# Patient Record
Sex: MALE | Race: BLACK OR AFRICAN AMERICAN | Employment: UNEMPLOYED | URBAN - METROPOLITAN AREA
[De-identification: names, ages, dates, MRNs, and addresses within clinical notes are randomized per-mention and may not be internally consistent; named-entity substitution may affect disease eponyms.]

---

## 2019-08-07 ENCOUNTER — OFFICE VISIT (OUTPATIENT)
Dept: INTERNAL MEDICINE | Age: 24
End: 2019-08-07
Payer: COMMERCIAL

## 2019-08-07 VITALS
SYSTOLIC BLOOD PRESSURE: 124 MMHG | WEIGHT: 200 LBS | HEART RATE: 113 BPM | DIASTOLIC BLOOD PRESSURE: 84 MMHG | BODY MASS INDEX: 30.41 KG/M2

## 2019-08-07 DIAGNOSIS — Z23 NEED FOR PROPHYLACTIC VACCINATION AGAINST STREPTOCOCCUS PNEUMONIAE (PNEUMOCOCCUS): ICD-10-CM

## 2019-08-07 DIAGNOSIS — L91.0 KELOID SCAR: Primary | ICD-10-CM

## 2019-08-07 DIAGNOSIS — Z23 ENCOUNTER FOR VACCINATION: ICD-10-CM

## 2019-08-07 DIAGNOSIS — Z86.59 HISTORY OF ADHD: ICD-10-CM

## 2019-08-07 DIAGNOSIS — Z13.31 POSITIVE DEPRESSION SCREENING: ICD-10-CM

## 2019-08-07 DIAGNOSIS — G47.9 SLEEP DISTURBANCE: ICD-10-CM

## 2019-08-07 DIAGNOSIS — Z23 NEED FOR TDAP VACCINATION: ICD-10-CM

## 2019-08-07 DIAGNOSIS — Z78.9 UNCIRCUMCISED MALE: ICD-10-CM

## 2019-08-07 PROCEDURE — 90715 TDAP VACCINE 7 YRS/> IM: CPT | Performed by: INTERNAL MEDICINE

## 2019-08-07 PROCEDURE — 90632 HEPA VACCINE ADULT IM: CPT | Performed by: INTERNAL MEDICINE

## 2019-08-07 PROCEDURE — G8419 CALC BMI OUT NRM PARAM NOF/U: HCPCS | Performed by: STUDENT IN AN ORGANIZED HEALTH CARE EDUCATION/TRAINING PROGRAM

## 2019-08-07 PROCEDURE — G0010 ADMIN HEPATITIS B VACCINE: HCPCS | Performed by: INTERNAL MEDICINE

## 2019-08-07 PROCEDURE — 1036F TOBACCO NON-USER: CPT | Performed by: STUDENT IN AN ORGANIZED HEALTH CARE EDUCATION/TRAINING PROGRAM

## 2019-08-07 PROCEDURE — G8431 POS CLIN DEPRES SCRN F/U DOC: HCPCS | Performed by: STUDENT IN AN ORGANIZED HEALTH CARE EDUCATION/TRAINING PROGRAM

## 2019-08-07 PROCEDURE — 90732 PPSV23 VACC 2 YRS+ SUBQ/IM: CPT | Performed by: INTERNAL MEDICINE

## 2019-08-07 PROCEDURE — 99203 OFFICE O/P NEW LOW 30 MIN: CPT | Performed by: STUDENT IN AN ORGANIZED HEALTH CARE EDUCATION/TRAINING PROGRAM

## 2019-08-07 PROCEDURE — G8427 DOCREV CUR MEDS BY ELIG CLIN: HCPCS | Performed by: STUDENT IN AN ORGANIZED HEALTH CARE EDUCATION/TRAINING PROGRAM

## 2019-08-07 RX ORDER — UREA 10 %
1 LOTION (ML) TOPICAL NIGHTLY PRN
Qty: 30 TABLET | Refills: 0 | Status: SHIPPED | OUTPATIENT
Start: 2019-08-07

## 2019-08-07 ASSESSMENT — PATIENT HEALTH QUESTIONNAIRE - PHQ9
SUM OF ALL RESPONSES TO PHQ QUESTIONS 1-9: 17
4. FEELING TIRED OR HAVING LITTLE ENERGY: 3
10. IF YOU CHECKED OFF ANY PROBLEMS, HOW DIFFICULT HAVE THESE PROBLEMS MADE IT FOR YOU TO DO YOUR WORK, TAKE CARE OF THINGS AT HOME, OR GET ALONG WITH OTHER PEOPLE: 3
8. MOVING OR SPEAKING SO SLOWLY THAT OTHER PEOPLE COULD HAVE NOTICED. OR THE OPPOSITE, BEING SO FIGETY OR RESTLESS THAT YOU HAVE BEEN MOVING AROUND A LOT MORE THAN USUAL: 3
7. TROUBLE CONCENTRATING ON THINGS, SUCH AS READING THE NEWSPAPER OR WATCHING TELEVISION: 3
9. THOUGHTS THAT YOU WOULD BE BETTER OFF DEAD, OR OF HURTING YOURSELF: 1
2. FEELING DOWN, DEPRESSED OR HOPELESS: 1
5. POOR APPETITE OR OVEREATING: 0
3. TROUBLE FALLING OR STAYING ASLEEP: 3
SUM OF ALL RESPONSES TO PHQ9 QUESTIONS 1 & 2: 3
SUM OF ALL RESPONSES TO PHQ QUESTIONS 1-9: 17
6. FEELING BAD ABOUT YOURSELF - OR THAT YOU ARE A FAILURE OR HAVE LET YOURSELF OR YOUR FAMILY DOWN: 1
1. LITTLE INTEREST OR PLEASURE IN DOING THINGS: 2

## 2019-08-07 NOTE — PROGRESS NOTES
Patient and his mom updated to follow-up with psychiatry. Encounter for vaccination   -Patient updated regarding hepatitis A, hepatitis B, varicella vaccine, pneumococcal vaccine. The patient is unsure about her last tetanus booster. The patient will get a tetanus booster as well. Sleep disturbance   -Melatonin ordered as needed for sleep at night. The patient is recommended to follow-up with psychiatry for history of ADHD and schizophrenia. INSTRUCTIONS:   · No follow-ups on file. · Koby received counseling on the following healthy behaviors: nutrition, exercise and tobacco cessation    · Reviewed prior labs and health maintenance. · Discussed use, benefit, and side effects of prescribed medications. Barriers to medication compliance addressed. All patient questions answered. Pt voiced understanding.      · Patient given educational materials - see patient instructions    Laisha Herrera MD  PGY-2, Department of Internal Medicine  Beti [place], [place]  8/7/2019 1:39 PM

## 2019-11-13 ENCOUNTER — HOSPITAL ENCOUNTER (OUTPATIENT)
Age: 24
Setting detail: SPECIMEN
Discharge: HOME | End: 2019-11-13
Payer: COMMERCIAL

## 2019-11-13 ENCOUNTER — OFFICE VISIT (OUTPATIENT)
Dept: INTERNAL MEDICINE | Age: 24
End: 2019-11-13
Payer: COMMERCIAL

## 2019-11-13 VITALS
SYSTOLIC BLOOD PRESSURE: 145 MMHG | HEART RATE: 138 BPM | WEIGHT: 200 LBS | DIASTOLIC BLOOD PRESSURE: 77 MMHG | BODY MASS INDEX: 30.41 KG/M2

## 2019-11-13 DIAGNOSIS — F33.1 MODERATE EPISODE OF RECURRENT MAJOR DEPRESSIVE DISORDER (HCC): ICD-10-CM

## 2019-11-13 DIAGNOSIS — L91.0 KELOID: ICD-10-CM

## 2019-11-13 DIAGNOSIS — F33.1 MODERATE EPISODE OF RECURRENT MAJOR DEPRESSIVE DISORDER (HCC): Primary | ICD-10-CM

## 2019-11-13 DIAGNOSIS — Z23 NEEDS FLU SHOT: ICD-10-CM

## 2019-11-13 DIAGNOSIS — R00.0 TACHYCARDIA: ICD-10-CM

## 2019-11-13 PROBLEM — F32.9 MAJOR DEPRESSION: Status: ACTIVE | Noted: 2019-11-13

## 2019-11-13 LAB — TSH SERPL DL<=0.05 MIU/L-ACNC: 1.64 MIU/L (ref 0.3–5)

## 2019-11-13 PROCEDURE — G8419 CALC BMI OUT NRM PARAM NOF/U: HCPCS | Performed by: STUDENT IN AN ORGANIZED HEALTH CARE EDUCATION/TRAINING PROGRAM

## 2019-11-13 PROCEDURE — 93010 ELECTROCARDIOGRAM REPORT: CPT | Performed by: STUDENT IN AN ORGANIZED HEALTH CARE EDUCATION/TRAINING PROGRAM

## 2019-11-13 PROCEDURE — 90688 IIV4 VACCINE SPLT 0.5 ML IM: CPT | Performed by: STUDENT IN AN ORGANIZED HEALTH CARE EDUCATION/TRAINING PROGRAM

## 2019-11-13 PROCEDURE — 1036F TOBACCO NON-USER: CPT | Performed by: STUDENT IN AN ORGANIZED HEALTH CARE EDUCATION/TRAINING PROGRAM

## 2019-11-13 PROCEDURE — G8427 DOCREV CUR MEDS BY ELIG CLIN: HCPCS | Performed by: STUDENT IN AN ORGANIZED HEALTH CARE EDUCATION/TRAINING PROGRAM

## 2019-11-13 PROCEDURE — 99213 OFFICE O/P EST LOW 20 MIN: CPT | Performed by: STUDENT IN AN ORGANIZED HEALTH CARE EDUCATION/TRAINING PROGRAM

## 2019-11-13 PROCEDURE — 84443 ASSAY THYROID STIM HORMONE: CPT

## 2019-11-13 PROCEDURE — G8482 FLU IMMUNIZE ORDER/ADMIN: HCPCS | Performed by: STUDENT IN AN ORGANIZED HEALTH CARE EDUCATION/TRAINING PROGRAM

## 2019-11-13 PROCEDURE — 36415 COLL VENOUS BLD VENIPUNCTURE: CPT

## 2019-11-13 PROCEDURE — 99211 OFF/OP EST MAY X REQ PHY/QHP: CPT | Performed by: INTERNAL MEDICINE

## 2019-11-13 PROCEDURE — 93005 ELECTROCARDIOGRAM TRACING: CPT | Performed by: STUDENT IN AN ORGANIZED HEALTH CARE EDUCATION/TRAINING PROGRAM

## 2019-11-13 RX ORDER — DIAPER,BRIEF,INFANT-TODD,DISP
EACH MISCELLANEOUS
Qty: 1 TUBE | Refills: 1 | Status: SHIPPED | OUTPATIENT
Start: 2019-11-13 | End: 2019-11-20

## 2019-11-13 RX ORDER — SERTRALINE HYDROCHLORIDE 25 MG/1
25 TABLET, FILM COATED ORAL DAILY
Qty: 90 TABLET | Refills: 1 | Status: SHIPPED | OUTPATIENT
Start: 2019-11-13 | End: 2022-10-28 | Stop reason: ALTCHOICE

## 2022-08-03 ENCOUNTER — TELEPHONE (OUTPATIENT)
Dept: INTERNAL MEDICINE | Age: 27
End: 2022-08-03

## 2022-08-12 ENCOUNTER — OFFICE VISIT (OUTPATIENT)
Dept: INTERNAL MEDICINE | Age: 27
End: 2022-08-12
Payer: COMMERCIAL

## 2022-08-12 VITALS
OXYGEN SATURATION: 99 % | SYSTOLIC BLOOD PRESSURE: 160 MMHG | HEIGHT: 67 IN | DIASTOLIC BLOOD PRESSURE: 94 MMHG | BODY MASS INDEX: 38.77 KG/M2 | HEART RATE: 92 BPM | TEMPERATURE: 97.7 F | WEIGHT: 247 LBS

## 2022-08-12 DIAGNOSIS — Z13.220 SCREENING FOR LIPID DISORDERS: ICD-10-CM

## 2022-08-12 DIAGNOSIS — F41.9 ANXIETY: ICD-10-CM

## 2022-08-12 DIAGNOSIS — R03.0 ELEVATED BP WITHOUT DIAGNOSIS OF HYPERTENSION: Primary | ICD-10-CM

## 2022-08-12 DIAGNOSIS — Z11.3 SCREEN FOR STD (SEXUALLY TRANSMITTED DISEASE): ICD-10-CM

## 2022-08-12 DIAGNOSIS — L84 CALLUS OF FOOT: ICD-10-CM

## 2022-08-12 DIAGNOSIS — F33.9 EPISODE OF RECURRENT MAJOR DEPRESSIVE DISORDER, UNSPECIFIED DEPRESSION EPISODE SEVERITY (HCC): ICD-10-CM

## 2022-08-12 DIAGNOSIS — Z83.3 FAMILY HISTORY OF DIABETES MELLITUS: ICD-10-CM

## 2022-08-12 DIAGNOSIS — E66.09 CLASS 2 OBESITY DUE TO EXCESS CALORIES WITHOUT SERIOUS COMORBIDITY WITH BODY MASS INDEX (BMI) OF 38.0 TO 38.9 IN ADULT: ICD-10-CM

## 2022-08-12 PROCEDURE — 1036F TOBACCO NON-USER: CPT

## 2022-08-12 PROCEDURE — 99211 OFF/OP EST MAY X REQ PHY/QHP: CPT | Performed by: INTERNAL MEDICINE

## 2022-08-12 PROCEDURE — G8427 DOCREV CUR MEDS BY ELIG CLIN: HCPCS

## 2022-08-12 PROCEDURE — 99214 OFFICE O/P EST MOD 30 MIN: CPT

## 2022-08-12 PROCEDURE — G8417 CALC BMI ABV UP PARAM F/U: HCPCS

## 2022-08-12 RX ORDER — BLOOD PRESSURE TEST KIT
KIT MISCELLANEOUS
Qty: 1 EACH | Refills: 0 | Status: CANCELLED | OUTPATIENT
Start: 2022-08-12

## 2022-08-12 RX ORDER — BLOOD PRESSURE TEST KIT
KIT MISCELLANEOUS
Qty: 1 EACH | Refills: 0 | Status: SHIPPED | OUTPATIENT
Start: 2022-08-12 | End: 2022-09-30

## 2022-08-12 SDOH — ECONOMIC STABILITY: FOOD INSECURITY: WITHIN THE PAST 12 MONTHS, YOU WORRIED THAT YOUR FOOD WOULD RUN OUT BEFORE YOU GOT MONEY TO BUY MORE.: NEVER TRUE

## 2022-08-12 SDOH — ECONOMIC STABILITY: FOOD INSECURITY: WITHIN THE PAST 12 MONTHS, THE FOOD YOU BOUGHT JUST DIDN'T LAST AND YOU DIDN'T HAVE MONEY TO GET MORE.: NEVER TRUE

## 2022-08-12 ASSESSMENT — PATIENT HEALTH QUESTIONNAIRE - PHQ9
SUM OF ALL RESPONSES TO PHQ9 QUESTIONS 1 & 2: 0
1. LITTLE INTEREST OR PLEASURE IN DOING THINGS: 0
SUM OF ALL RESPONSES TO PHQ QUESTIONS 1-9: 0
SUM OF ALL RESPONSES TO PHQ QUESTIONS 1-9: 0
8. MOVING OR SPEAKING SO SLOWLY THAT OTHER PEOPLE COULD HAVE NOTICED. OR THE OPPOSITE, BEING SO FIGETY OR RESTLESS THAT YOU HAVE BEEN MOVING AROUND A LOT MORE THAN USUAL: 0
9. THOUGHTS THAT YOU WOULD BE BETTER OFF DEAD, OR OF HURTING YOURSELF: 0
10. IF YOU CHECKED OFF ANY PROBLEMS, HOW DIFFICULT HAVE THESE PROBLEMS MADE IT FOR YOU TO DO YOUR WORK, TAKE CARE OF THINGS AT HOME, OR GET ALONG WITH OTHER PEOPLE: 0
SUM OF ALL RESPONSES TO PHQ QUESTIONS 1-9: 0
5. POOR APPETITE OR OVEREATING: 0
7. TROUBLE CONCENTRATING ON THINGS, SUCH AS READING THE NEWSPAPER OR WATCHING TELEVISION: 0
2. FEELING DOWN, DEPRESSED OR HOPELESS: 0
6. FEELING BAD ABOUT YOURSELF - OR THAT YOU ARE A FAILURE OR HAVE LET YOURSELF OR YOUR FAMILY DOWN: 0
4. FEELING TIRED OR HAVING LITTLE ENERGY: 0
SUM OF ALL RESPONSES TO PHQ QUESTIONS 1-9: 0
3. TROUBLE FALLING OR STAYING ASLEEP: 0

## 2022-08-12 ASSESSMENT — ENCOUNTER SYMPTOMS
NAUSEA: 0
BACK PAIN: 0
SHORTNESS OF BREATH: 0
CHEST TIGHTNESS: 0
COUGH: 0
ABDOMINAL PAIN: 0
CONSTIPATION: 0
DIARRHEA: 0

## 2022-08-12 ASSESSMENT — SOCIAL DETERMINANTS OF HEALTH (SDOH): HOW HARD IS IT FOR YOU TO PAY FOR THE VERY BASICS LIKE FOOD, HOUSING, MEDICAL CARE, AND HEATING?: NOT VERY HARD

## 2022-08-12 NOTE — PROGRESS NOTES
[facility] [address] [address]  Dept: [phone]  Dept Fax: [phone]    Office Progress/Follow Up Note  Date ofpatient's visit: 8/12/2022  Patient's Name:  Karlos Ace YOB: 1995            Patient Care Team:  Niraj Avila MD as PCP - General (Internal Medicine)  ================================================================    REASON FOR VISIT/CHIEF COMPLAINT:  FOLLOW-UP VISIT; Established New Doctor    HISTORY OF PRESENTING ILLNESS:  History was obtained from: patient, electronic medical record. Koby Rodriguez a 33 y.o. with a past medical history of keloid scars and depression he is here for a follow-up after a recent emergency department visit and [address] on 7-26 in which she was diagnosed with Bell's palsy. Patient states that he had symptoms on and off for 3 weeks he was prescribed 7 days of prednisone and 7 days of valacyclovir. Patient says symptoms have since resolved. Patient has not been seen in the clinic since November of 2019. Patient is new to me, on initial encounter patient's blood pressure was 160/94. When asked if he is hypertensive at home he said only during bouts of anxiety and he is currently feeling anxious while in the office. He complains of having anxiety and depressive episodes on and off for the past 2 years. Patient has been prescribed Zoloft and he has not been taking it regularly nor has he had his prescriptions refilled. Patient states that he takes family members medication while at home. He has been taking melatonin at night to help with initiating sleep which she would also like a prescription for. Patient has a PHQ score of 0 per MA. Patient also had a complaint of a callus on his right foot that has been there for some time patient states it is getting better however is still bothersome.   I examined the foot there was no redness tenderness on palpation does not look like a source of infection. Family history:  Sister: type 2 diabetes  Mother: gestational diabetes    Social history:  Smoking: Patient states he quit 2 months ago. Alcohol: None  Illicit Drug use: Smokes marijuana occasionally  COVID: None  Occupation: None     Lab Results   Component Value Date    HGB 13.5 05/25/2016    CREATININE 0.89 05/25/2016         Patient Active Problem List   Diagnosis    Appendicitis    Major depression    Keloid       Health Maintenance Due   Topic Date Due    COVID-19 Vaccine (1) Never done    Varicella vaccine (1 of 2 - 2-dose childhood series) Never done    HPV vaccine (1 - Male 2-dose series) Never done    HIV screen  Never done    Hepatitis C screen  Never done       No Known Allergies      Current Outpatient Medications   Medication Sig Dispense Refill    Blood Pressure Monitoring (3 SERIES BP MONITOR/UPPER ARM) CHANTALE Check BP 2/day 1 each 0    sertraline (ZOLOFT) 25 MG tablet Take 1 tablet by mouth daily 90 tablet 1    melatonin 1 MG tablet Take 1 tablet by mouth nightly as needed for Sleep 30 tablet 0     No current facility-administered medications for this visit. Social History     Tobacco Use    Smoking status: Former    Smokeless tobacco: Never   Substance Use Topics    Alcohol use: No    Drug use: No       No family history on file. No past medical history on file. Past Surgical History:   Procedure Laterality Date    APPENDECTOMY  5/24/2016    laproscopic        REVIEW OF SYSTEMS:  Review of Systems   Constitutional:  Negative for activity change, appetite change, chills, fatigue and fever. Patient states he has had increased anxiety and depressive episodes lately. Eyes:  Negative for visual disturbance. Respiratory:  Negative for cough, chest tightness and shortness of breath. Cardiovascular:  Negative for chest pain and leg swelling. Gastrointestinal:  Negative for abdominal pain, constipation, diarrhea and nausea.    Musculoskeletal: Negative for arthralgias and back pain. Skin:         Keloid scarring over sternum as well as scapula. Patient has 3 calluses on right foot. Neurological:  Negative for dizziness and facial asymmetry. Psychiatric/Behavioral:  Positive for dysphoric mood and sleep disturbance. Feelings of anxiety     PHYSICAL EXAM:  Vitals:    08/12/22 0936 08/12/22 0942   BP: (!) 138/100 (!) 160/94   Site: Left Upper Arm Left Upper Arm   Position: Sitting Sitting   Cuff Size: Large Adult Large Adult   Pulse: 89 92   Temp: 97.7 °F (36.5 °C)    SpO2: 99%    Weight: 247 lb (112 kg)    Height: 5' 7\" (1.702 m)      BP Readings from Last 3 Encounters:   08/12/22 (!) 160/94   11/13/19 (!) 145/77   08/07/19 124/84        Physical Exam  Constitutional:       Appearance: Normal appearance. He is obese. HENT:      Head: Normocephalic and atraumatic. Nose: Nose normal.      Mouth/Throat:      Mouth: Mucous membranes are moist.      Pharynx: Oropharynx is clear. Eyes:      Extraocular Movements: Extraocular movements intact. Conjunctiva/sclera: Conjunctivae normal.      Pupils: Pupils are equal, round, and reactive to light. Cardiovascular:      Rate and Rhythm: Normal rate and regular rhythm. Pulmonary:      Effort: Pulmonary effort is normal.      Breath sounds: Normal breath sounds. Abdominal:      General: Bowel sounds are normal.      Palpations: Abdomen is soft. Musculoskeletal:         General: Normal range of motion. Cervical back: Normal range of motion and neck supple. Skin:     General: Skin is warm and dry. Comments: Patient has keloid scars over started on an scapula. Patient also complains of calluses on right foot counseled on proper shoe wear and podiatry referral.   Neurological:      General: No focal deficit present. Mental Status: He is alert. Comments: Patient states he has had recent difficulties falling asleep.    Psychiatric:      Comments: Patient states he has related illnesses. Koby was seen today for established new doctor. Diagnoses and all orders for this visit:    Elevated BP without diagnosis of hypertension  -     Blood Pressure Monitoring (3 SERIES BP MONITOR/UPPER ARM) CHANTALE; Check BP 2/day  -     Basic Metabolic Panel; Future  -     Urinalysis; Future    Anxiety  -     Ambulatory referral to Behavioral Health    Episode of recurrent major depressive disorder, unspecified depression episode severity ([address]  -     Ambulatory referral to [address] obesity due to excess calories without serious comorbidity with body mass index (BMI) of 38.0 to 38.9 in adult    Callus of foot  -     [facility], [place] [address] for STD (sexually transmitted disease)  -     HIV Screen; Future  -     Hepatitis C Antibody; Future    Screening for lipid disorders  -     Lipid Panel; Future    Family history of diabetes mellitus      FOLLOW UP AND INSTRUCTIONS:  Return in about 4 weeks (around 9/9/2022). Koby received counseling on the following healthy behaviors: nutrition, exercise, and medication adherence, as well as importance of follow-up to referred services. Discussed use, benefit, and side effects of prescribed medications. Barriers to medication compliance addressed. All patient questions answered. Pt voiced understanding. Patient given educational materials - see patient instructions  Toyin Stephens MD   Internal Medicine  8/12/2022, 10:48 AM    This note is created with the assistance of a speech-recognition program. While intending to generate a document that actually reflects the content of thevisit, the document can still have some mistakes which may not have been identified and corrected by editing.

## 2022-08-12 NOTE — PROGRESS NOTES
Attending Physician Statement  I have discussed the care of El Dorado Sondra, including pertinent history and exam findings,  with the resident. I have seen and examined the patient and the key elements of all parts of the encounter have been performed by me. I agree with the assessment, plan and orders as documented by the resident.   (GC Modifier)

## 2022-08-15 ENCOUNTER — TELEPHONE (OUTPATIENT)
Age: 27
End: 2022-08-15

## 2022-09-05 NOTE — PROGRESS NOTES
ADULT BEHAVIORAL HEALTH       Visit Date: 9/6/22   Time of appointment:  2:00pm  Time spent with Patient: 55 minutes. This is patient's Initial appointment. Reason for Consult: Depression, anxiety      Referring Provider/PCP:    Joanette Bence, MD Flavia Cohen, MD      Pt provided informed consent for the behavioral health program. Discussed with patient model of service to include the limits of confidentiality (i.e. abuse reporting, suicide intervention, etc.) and short-term intervention focused approach. Pt indicated understanding. PRESENTING PROBLEM AND HISTORY  Cassie Marcos is a 33 y.o. male who presents for new evaluation and treatment ofdepression. He has the following symptoms: depressed mood, decreased appetite, increased appetite, weight gain, weight loss, increased sleep, decreased sleep, lack of motivation, low self-esteem, isolating self, feelings of worthlessness, anger/irritability, racing thoughts/flight of ideas, feeling nervous, anxious, or on edge, racing worry thoughts, excessive anxiety and worry about specific stressors, and inability to stop or control worry. Onset of symptoms was approximately 20 years. Symtpomsms have been gradually worsening since that time. He denies current suicidal and homicidal ideation. Family history significant for depression. Risk factors: none. Previous treatment includes Zoloft. PHQ score of a 13. Pt reports he is planning to discuss medication moving forward. MENTAL STATUS EXAM  Mood was anxious with anxious affect. Suicidal ideation was denied. Homicidal ideation was denied. Hygiene was fair . Dress was appropriate. Behavior was Within Normal Limits with No observation of difficulties ambulating. Attitude was Cooperative. Eye-contact was fair. Speech: rate - WNL, rhythm -  WNL, volume - WNL  Verbalizations were coherent.   Thought processes were intact and goal-oriented with evidence of delusions, hallucinations, obsessions, or paula; with little cognitive distortions. Associations were characterized by intact cognitive processes. Pt was oriented to person, place, time, and general circumstances;  recent:  fair. Insight and judgment were estimated to be fair, AEB, a fair  understanding of cyclical maladaptive patterns, and the ability to use insight to inform behavior change. CURRENT MEDICATIONS    Current Outpatient Medications:     Blood Pressure Monitoring (3 SERIES BP MONITOR/UPPER ARM) CHANTALE, Check BP 2/day, Disp: 1 each, Rfl: 0    sertraline (ZOLOFT) 25 MG tablet, Take 1 tablet by mouth daily, Disp: 90 tablet, Rfl: 1    melatonin 1 MG tablet, Take 1 tablet by mouth nightly as needed for Sleep, Disp: 30 tablet, Rfl: 0     FAMILY MEDICAL/MH HISTORY   His family history is not on file. PATIENT MENTAL HEALTH HISTORY  Pt noted currently taking no medication but has plans to talk to family MD regarding medication in the future. PSYCHOSOCIAL HISTORY  Current living situation: Pt noted he is currently living at his mothers house. Pt noted having one brother living with him (7), pt noted having three school age sisters who also live with her. Pt noted having several other older siblings. Pt noted his father being alive but that he speaks to him only a few times per year but it is not a very good relationship. Pt noted not having any biological children at this time. Work/Education: Pt noted not currently working at this time. Pt noted having SSI due to bx issues and learning issues. Pt noted working at [employer] one time for several weeks. Support system: Pt noted feeling like he can talk to his mother. Pt noted his brother is supportive as well. Restorationism/Spirituality: Pt noted he is not sure he believes in God. DRUG AND ALCOHOL CURRENT USE/HISTORY  TOBACCO:  He reports that he has quit smoking. He has never used smokeless tobacco.  ALCOHOL:  He reports no history of alcohol use.   OTHER SUBSTANCES: He reports no history of drug use. ASSESSMENT  Melissa Peralta presented to the appointment today for evaluation and treatment of symptoms of    Diagnosis Orders   1. Major depressive disorder, recurrent episode, moderate with anxious distress ([address]             He is currently deemed no risk to himself or others and meets criteria for major depressive d/o recurrent moderate with anxious distress. He would benefit from a medication evaluation to assess if mental health medications could be helpful in treating depression symptoms. Koby's depression and anxiety symptoms are well controlled at this time. He will also benefit from brief and solution-focused consultation to address cognitive and behavioral interventions for depressive and anxiety symptoms. Koby was in agreement with recommendations. PHQ Scores 9/6/2022 8/12/2022 8/7/2019   PHQ2 Score 4 0 3   PHQ9 Score 13 0 17     Interpretation of Total Score Depression Severity: 1-4 = Minimal depression, 5-9 = Mild depression, 10-14 = Moderate depression, 15-19 = Moderately severe depression, 20-27 = Severe depression    How often pt has had thoughts of death or hurting self (if PHQ positive for depression):  Not at all    No flowsheet data found. Interpretation of PHYLICIA-7 score: 5-9 = mild anxiety, 10-14 = moderate anxiety, 15+ = severe anxiety. Recommend referral to behavioral health for scores 10 or greater. DIAGNOSIS/Plan  1. Major depressive disorder, recurrent episode, moderate with anxious distress (HCC)     No follow-ups on file. INTERVENTION  Trained in relaxation strategies, Provided education, Established rapport, CBT to target depression, and Identified maladaptive thoughts    INTERACTIVE COMPLEXITY  Is interactive complexity present? No  Reason:  N/A  Additional Supporting Information:  N/A     Melissa Peralta, was evaluated through a synchronous (real-time) audio-video encounter.  The patient (or guardian if applicable) is aware that this is a billable service. Verbal consent to proceed has been obtained within the past 12 months. The visit was conducted pursuant to the emergency declaration under the [address] [place] [address] authority and the [employer] and [employer] Act. Patient identification was verified, and a caregiver was present when appropriate. The patient was located in a state where the provider was credentialed to provide care.      Electronically signed by REENA Ibarra on 9/5/22 at 10:32 AM EDT

## 2022-09-06 ENCOUNTER — TELEMEDICINE (OUTPATIENT)
Dept: BEHAVIORAL/MENTAL HEALTH CLINIC | Age: 27
End: 2022-09-06
Payer: COMMERCIAL

## 2022-09-06 DIAGNOSIS — F33.1 MAJOR DEPRESSIVE DISORDER, RECURRENT EPISODE, MODERATE WITH ANXIOUS DISTRESS (HCC): ICD-10-CM

## 2022-09-06 PROBLEM — F32.9 MAJOR DEPRESSION: Status: RESOLVED | Noted: 2019-11-13 | Resolved: 2022-09-06

## 2022-09-06 PROCEDURE — 90791 PSYCH DIAGNOSTIC EVALUATION: CPT | Performed by: SOCIAL WORKER

## 2022-09-06 ASSESSMENT — PATIENT HEALTH QUESTIONNAIRE - PHQ9
SUM OF ALL RESPONSES TO PHQ9 QUESTIONS 1 & 2: 4
SUM OF ALL RESPONSES TO PHQ QUESTIONS 1-9: 13
5. POOR APPETITE OR OVEREATING: 1
8. MOVING OR SPEAKING SO SLOWLY THAT OTHER PEOPLE COULD HAVE NOTICED. OR THE OPPOSITE, BEING SO FIGETY OR RESTLESS THAT YOU HAVE BEEN MOVING AROUND A LOT MORE THAN USUAL: 1
3. TROUBLE FALLING OR STAYING ASLEEP: 1
SUM OF ALL RESPONSES TO PHQ QUESTIONS 1-9: 13
10. IF YOU CHECKED OFF ANY PROBLEMS, HOW DIFFICULT HAVE THESE PROBLEMS MADE IT FOR YOU TO DO YOUR WORK, TAKE CARE OF THINGS AT HOME, OR GET ALONG WITH OTHER PEOPLE: 1
6. FEELING BAD ABOUT YOURSELF - OR THAT YOU ARE A FAILURE OR HAVE LET YOURSELF OR YOUR FAMILY DOWN: 1
7. TROUBLE CONCENTRATING ON THINGS, SUCH AS READING THE NEWSPAPER OR WATCHING TELEVISION: 2
9. THOUGHTS THAT YOU WOULD BE BETTER OFF DEAD, OR OF HURTING YOURSELF: 0
4. FEELING TIRED OR HAVING LITTLE ENERGY: 3
2. FEELING DOWN, DEPRESSED OR HOPELESS: 1
1. LITTLE INTEREST OR PLEASURE IN DOING THINGS: 3

## 2022-09-06 ASSESSMENT — COLUMBIA-SUICIDE SEVERITY RATING SCALE - C-SSRS
1. WITHIN THE PAST MONTH, HAVE YOU WISHED YOU WERE DEAD OR WISHED YOU COULD GO TO SLEEP AND NOT WAKE UP?: NO
2. HAVE YOU ACTUALLY HAD ANY THOUGHTS OF KILLING YOURSELF?: NO
6. HAVE YOU EVER DONE ANYTHING, STARTED TO DO ANYTHING, OR PREPARED TO DO ANYTHING TO END YOUR LIFE?: NO

## 2022-09-06 NOTE — Clinical Note
Pt meets dsm5 criteria for major depressive disorder recurrent moderate with anxious distress. Pt is open to medication, pt is willing to engage in weekly therapy.

## 2022-09-07 ENCOUNTER — TELEPHONE (OUTPATIENT)
Dept: BEHAVIORAL/MENTAL HEALTH CLINIC | Age: 27
End: 2022-09-07

## 2022-09-07 NOTE — TELEPHONE ENCOUNTER
.Contacted patient today in regards to missed appointment. Rescheduled. Was call completed? If not, reason: Call was completed    Does patient want to continue services? If no, and the patient is a new patient, please close the referral. Also, if no, please document reason and route message to provider. yes    Anything the provider should be aware of? If yes, please route call.  No    Reason for Missed Appointment: connection issue

## 2022-09-09 ENCOUNTER — OFFICE VISIT (OUTPATIENT)
Dept: PODIATRY | Age: 27
End: 2022-09-09
Payer: COMMERCIAL

## 2022-09-09 VITALS
HEIGHT: 67 IN | BODY MASS INDEX: 38.77 KG/M2 | SYSTOLIC BLOOD PRESSURE: 146 MMHG | DIASTOLIC BLOOD PRESSURE: 88 MMHG | WEIGHT: 247 LBS | HEART RATE: 96 BPM

## 2022-09-09 DIAGNOSIS — Q82.8 PLANTAR KERATOSIS: Primary | ICD-10-CM

## 2022-09-09 PROCEDURE — 11055 PARING/CUTG B9 HYPRKER LES 1: CPT

## 2022-09-09 PROCEDURE — 99999 PR OFFICE/OUTPT VISIT,PROCEDURE ONLY: CPT

## 2022-09-09 NOTE — PROGRESS NOTES
[address]  [address] [place] [address], Jie Parrish  Tel: [phone]   Fax: [phone]    Subjective     CC: Intractable plantar keratosis, right foot    HPI:  [place] is a 33y.o. year old male who presents to clinic today for a painful lesion on his plantar lateral right foot. The lesion has been present for several months at the base of the styloid process. The patient states they have had these lesions for several months and they are painful, making it difficult to ambulate. The patient has no history of previous lesions. The patient has not tried any conservative measurements. The patient denies any other complaints. Denies any consitutional symptoms. Primary care physician is Orlando Woods MD.    ROS:    Constitutional: Denies nausea, vomiting, fever, chills. Neurologic: Denies numbness, tingling, and burning in the feet. Vascular: Denies symptoms of lower extremity claudication. Skin: Denies open wounds. Otherwise negative except as noted in the HPI. PMH:  No past medical history on file. Surgical History:   Past Surgical History:   Procedure Laterality Date    APPENDECTOMY  5/24/2016    laproscopic       Social History:  Social History     Tobacco Use    Smoking status: Former    Smokeless tobacco: Never   Substance Use Topics    Alcohol use: No    Drug use: No       Medications:  Prior to Admission medications    Medication Sig Start Date End Date Taking? Authorizing Provider   Blood Pressure Monitoring (3 SERIES BP MONITOR/UPPER ARM) CHANTALE Check BP 2/day 8/12/22   Nixon Sanford MD   sertraline (ZOLOFT) 25 MG tablet Take 1 tablet by mouth daily 11/13/19   Adry Peters MD   melatonin 1 MG tablet Take 1 tablet by mouth nightly as needed for Sleep 8/7/19   Elza Kiran MD       Objective     Vitals:    09/09/22 1434   BP: (!) 146/88   Pulse: 96       No results found for: LABA1C    Physical Exam:  General:  Alert and oriented x3. In no acute distress. Lower Extremity Physical Exam:    Vascular: DP and PT pulses are 2+ palpable, Bilateral. CFT <33No seconds to all digits, Bilateral.  No edema, Bilateral.  Hair growth is present to the level of the digits, Bilateral.     Neuro: Saph/sural/SP/DP/plantar sensation present to light touch. Protective sensation is intact to 10/10 sites as tested with a 5.07g SWMF, Bilateral.     Musculoskeletal: EHL/FHL/GS/TA gross motor intact. Tenderness to palpation of when the lesion with direct pressure but not when squeezed. Gross deformity is absent, Bilateral. Pes planus bilaterally. Dermatologic: Skin appears well hydrated and supple. Good color, texture, turgor. No open lesions present. Intractable plantar keratotic lesion noted on the plantar aspect of the right styloid process. No thrombosed capillaries or disruption of skin lines. Lesion measures 0.3 cm in diameter. Webspaces clean and dry 1-4. Biomechanical Exam:    Right foot: 1st MTPJ: Loaded:50 Unloaded:60  Right foot: 1st Ray: +/- 5 mm      Right foot: Ankle DF knee extended: 30  Right foot: Ankle DF knee flexed: 35  Gross pes planus    Left foot: 1st MTPJ: Loaded:50 Unloaded:60  Left foot: 1st Ray: +/- 5 mm  Left foot: Ankle DF knee extended: 30  Left foot: Ankle DF knee flexed: 35  Gross pes planus    Gait Analysis: Normal    Imaging: None    Assessment   Isabel Fry is a 33 y.o. male with     Diagnosis Orders   1. Plantar keratosis [Q82.8 (ICD-10-CM)]             Plan   A comprehensive history and physical examination were preformed. The patient was educated on clinical and radiographic findings, diagnosis and treatment plans. Patient states that he understands all that has been explained and all questions were answered to his apparent satisfaction. Discussed findings and possible etiology of patient's condition. We discussed the options for treatment.  We discussed the nature of the [address], that they can be resistant to treatment and that recurrence can occur. We discussed the risks, benefits, and alternatives. Discussed the risk of scarring and pain with treatment. Patient elected to proceed with debridement of the [address]. Patient was educated on details of the procedure as well as medically reasonable risks, benefits and complications. Verbal consent was obtained. Please see procedure note below. Patient to RTC as needed if symptoms worsen. Procedure note:   Patient verified by: YES  Site of the procedure confirmed:Yes  Site: Right plantar foot    Sharp debridement was performed to right foot IPK utilizing 15 blade to the level of the dermis. No bleeding was noted. Patient tolerated procedure well and without complication.        Wilmer Nichols DPM   Podiatric Medicine & Surgery   9/9/2022 at 3:46 PM

## 2022-09-12 NOTE — PROGRESS NOTES
ADULT BEHAVIORAL HEALTH FOLLOW UP      Visit Date: 9/14/2022   Time of appointment:  2:00pm   Time spent with Patient: 40 minutes. This is patient's second appointment. Reason for Consult:  Depression aggressive. Referring Provider/PCP:    No ref. provider found  Gunjan King MD      Pt provided informed consent for the behavioral health program. Discussed with patient model of service to include the limits of confidentiality (i.e. abuse reporting, suicide intervention, etc.) and short-term intervention focused approach. Pt indicated understanding. Khushi Aguirre is a 33 y.o. male who presents for follow up of depression, anxiety. Pt completed PHQ9 (18) and PHYLICIA 7 (15) and pt agreed to objective to reduce both by half. Previous Recommendations: Attend future therapy sessions. Pt encouraged to call 911 and/or go to ER in the event pt is having suicidal or homicidal ideation. MENTAL STATUS EXAM  Mood was constricted with anxious affect. Suicidal ideation was denied. Homicidal ideation was denied. Hygiene was fair . Dress was appropriate. Behavior was Within Normal Limits with No observation of difficulties ambulating. Attitude was Cooperative. Eye-contact was fair. Speech: rate - WNL, rhythm - WNL, volume - WNL. Verbalizations were coherent. Thought processes were intact and goal-oriented without evidence of delusions, hallucinations, obsessions, or paula; with little cognitive distortions. Associations were characterized by intact cognitive processes. Pt was oriented to person, place, time, and general circumstances;  recent:  fair. Insight and judgment were estimated to be fair, AEB, a fair  understanding of cyclical maladaptive patterns, and the ability to use insight to inform behavior change. ASSESSMENT  Peggy Cano presented to the appointment today for evaluation and treatment of symptoms of depression and anxiety. Nafisa Borrero     He is currently deemed no risk to himself or others and meets criteria for major depressive disorder recurrent moderate with anxious distress. Koby was in agreement with recommendations. PHQ Scores 9/6/2022 8/12/2022 8/7/2019   PHQ2 Score 4 0 3   PHQ9 Score 13 0 17     Interpretation of Total Score Depression Severity: 1-4 = Minimal depression, 5-9 = Mild depression, 10-14 = Moderate depression, 15-19 = Moderately severe depression, 20-27 = Severe depression    How often pt has had thoughts of death or hurting self (if PHQ positive for depression):       No flowsheet data found. Interpretation of PHYLICIA-7 score: 5-9 = mild anxiety, 10-14 = moderate anxiety, 15+ = severe anxiety. Recommend referral to behavioral health for scores 10 or greater. DIAGNOSIS/PLAN  1. Major depressive disorder, recurrent episode, moderate with anxious distress ([facility] [address]         INTERVENTION  Practiced assertive communication, Trained in relaxation strategies, Provided education, Established rapport, Supportive techniques, and CBT to target depression      INTERACTIVE COMPLEXITY  Is interactive complexity present? No  Reason:  N/A  Additional Supporting Information:  N/A     Matthew Delgado, was evaluated through a synchronous (real-time) audio-video encounter. The patient (or guardian if applicable) is aware that this is a billable service. Verbal consent to proceed has been obtained within the past 12 months. The visit was conducted pursuant to the emergency declaration under the [address] [facility], [address] [place] [address] waiver authority and the [employer] General Act. Patient identification was verified, and a caregiver was present when appropriate. The patient was located in a state where the provider was credentialed to provide care.      Electronically signed by REENA Mendoza on 9/12/2022 at 11:51 AM

## 2022-09-14 ENCOUNTER — TELEMEDICINE (OUTPATIENT)
Dept: BEHAVIORAL/MENTAL HEALTH CLINIC | Age: 27
End: 2022-09-14
Payer: COMMERCIAL

## 2022-09-14 DIAGNOSIS — F33.1 MAJOR DEPRESSIVE DISORDER, RECURRENT EPISODE, MODERATE WITH ANXIOUS DISTRESS (HCC): Primary | ICD-10-CM

## 2022-09-14 PROCEDURE — 90834 PSYTX W PT 45 MINUTES: CPT | Performed by: SOCIAL WORKER

## 2022-09-14 ASSESSMENT — PATIENT HEALTH QUESTIONNAIRE - PHQ9
7. TROUBLE CONCENTRATING ON THINGS, SUCH AS READING THE NEWSPAPER OR WATCHING TELEVISION: 1
2. FEELING DOWN, DEPRESSED OR HOPELESS: 3
SUM OF ALL RESPONSES TO PHQ QUESTIONS 1-9: 18
10. IF YOU CHECKED OFF ANY PROBLEMS, HOW DIFFICULT HAVE THESE PROBLEMS MADE IT FOR YOU TO DO YOUR WORK, TAKE CARE OF THINGS AT HOME, OR GET ALONG WITH OTHER PEOPLE: 2
3. TROUBLE FALLING OR STAYING ASLEEP: 2
SUM OF ALL RESPONSES TO PHQ QUESTIONS 1-9: 18
9. THOUGHTS THAT YOU WOULD BE BETTER OFF DEAD, OR OF HURTING YOURSELF: 1
1. LITTLE INTEREST OR PLEASURE IN DOING THINGS: 3
SUM OF ALL RESPONSES TO PHQ QUESTIONS 1-9: 17
4. FEELING TIRED OR HAVING LITTLE ENERGY: 2
6. FEELING BAD ABOUT YOURSELF - OR THAT YOU ARE A FAILURE OR HAVE LET YOURSELF OR YOUR FAMILY DOWN: 3
SUM OF ALL RESPONSES TO PHQ9 QUESTIONS 1 & 2: 6
8. MOVING OR SPEAKING SO SLOWLY THAT OTHER PEOPLE COULD HAVE NOTICED. OR THE OPPOSITE, BEING SO FIGETY OR RESTLESS THAT YOU HAVE BEEN MOVING AROUND A LOT MORE THAN USUAL: 1
5. POOR APPETITE OR OVEREATING: 2
SUM OF ALL RESPONSES TO PHQ QUESTIONS 1-9: 18

## 2022-09-14 ASSESSMENT — ANXIETY QUESTIONNAIRES
IF YOU CHECKED OFF ANY PROBLEMS ON THIS QUESTIONNAIRE, HOW DIFFICULT HAVE THESE PROBLEMS MADE IT FOR YOU TO DO YOUR WORK, TAKE CARE OF THINGS AT HOME, OR GET ALONG WITH OTHER PEOPLE: EXTREMELY DIFFICULT
GAD7 TOTAL SCORE: 15
5. BEING SO RESTLESS THAT IT IS HARD TO SIT STILL: 3
6. BECOMING EASILY ANNOYED OR IRRITABLE: 3
7. FEELING AFRAID AS IF SOMETHING AWFUL MIGHT HAPPEN: 3
1. FEELING NERVOUS, ANXIOUS, OR ON EDGE: 1
4. TROUBLE RELAXING: 1
3. WORRYING TOO MUCH ABOUT DIFFERENT THINGS: 1
2. NOT BEING ABLE TO STOP OR CONTROL WORRYING: 3

## 2022-09-14 ASSESSMENT — COLUMBIA-SUICIDE SEVERITY RATING SCALE - C-SSRS
1. WITHIN THE PAST MONTH, HAVE YOU WISHED YOU WERE DEAD OR WISHED YOU COULD GO TO SLEEP AND NOT WAKE UP?: NO
6. HAVE YOU EVER DONE ANYTHING, STARTED TO DO ANYTHING, OR PREPARED TO DO ANYTHING TO END YOUR LIFE?: NO
2. HAVE YOU ACTUALLY HAD ANY THOUGHTS OF KILLING YOURSELF?: NO

## 2022-09-14 NOTE — PATIENT INSTRUCTIONS
Pt. Will identify and participate in positive activities to increase self-esteem  Utilize positive supports system  Keep up on self-care  Pt will use daily positive self-talk

## 2022-09-15 NOTE — PROGRESS NOTES
currently deemed no risk to himself or others and meets criteria for 1. Major depressive disorder, recurrent episode, moderate with anxious distress ([facility]  Koby was in agreement with recommendations. PHQ Scores 9/14/2022 9/6/2022 8/12/2022 8/7/2019   PHQ2 Score 6 4 0 3   PHQ9 Score 18 13 0 17     Interpretation of Total Score Depression Severity: 1-4 = Minimal depression, 5-9 = Mild depression, 10-14 = Moderate depression, 15-19 = Moderately severe depression, 20-27 = Severe depression    How often pt has had thoughts of death or hurting self (if PHQ positive for depression):       PHYLICIA 7 SCORE 9/14/2022   PHYLICIA-7 Total Score 15     Interpretation of PHYLICIA-7 score: 5-9 = mild anxiety, 10-14 = moderate anxiety, 15+ = severe anxiety. Recommend referral to behavioral health for scores 10 or greater. DIAGNOSIS/PLAN  1. Major depressive disorder, recurrent episode, moderate with anxious distress (HCC)         INTERVENTION  Trained in relaxation strategies, Provided education, Established rapport, Supportive techniques, and CBT to target depression anxiety      INTERACTIVE COMPLEXITY  Is interactive complexity present? No  Reason:  N/A  Additional Supporting Information:  N/A     Ghazalabot Buerger, was evaluated through a synchronous (real-time) audio-video encounter. The patient (or guardian if applicable) is aware that this is a billable service. Verbal consent to proceed has been obtained within the past 12 months. The visit was conducted pursuant to the emergency declaration under the [facility], [address] [place] [address] authority and the [employer] General Act. Patient identification was verified, and a caregiver was present when appropriate. The patient was located in a state where the provider was credentialed to provide care.      Electronically signed by REENA Godoy on 9/14/2022 at 9:21 PM

## 2022-09-19 ENCOUNTER — TELEMEDICINE (OUTPATIENT)
Dept: BEHAVIORAL/MENTAL HEALTH CLINIC | Age: 27
End: 2022-09-19
Payer: COMMERCIAL

## 2022-09-19 DIAGNOSIS — F33.1 MAJOR DEPRESSIVE DISORDER, RECURRENT EPISODE, MODERATE WITH ANXIOUS DISTRESS (HCC): Primary | ICD-10-CM

## 2022-09-19 PROCEDURE — 90834 PSYTX W PT 45 MINUTES: CPT | Performed by: SOCIAL WORKER

## 2022-09-19 NOTE — PATIENT INSTRUCTIONS
PT will work to become more active and use more physical activity.    Pt. Will identify and participate in positive activities to increase self-esteem  Utilize positive supports system  Keep up on self-care  Pt will use daily positive self-talk

## 2022-09-25 NOTE — PROGRESS NOTES
ADULT BEHAVIORAL HEALTH FOLLOW UP      Visit Date: 9/27/2022  Time of appointment: 9:02am  Time spent with Patient: 40 minutes   This is patient's fourth appointment. Reason for Consult: Depression, anxiety      Referring Provider/PCP:    Ale Geronimo MD      Pt provided informed consent for the behavioral health program. Discussed with patient model of service to include the limits of confidentiality (i.e. abuse reporting, suicide intervention, etc.) and short-term intervention focused approach. Pt indicated understanding. Saira An is a 33 y.o. male who presents for follow up of depression, anxiety. Pt processed various pros and cons of his current situation. Pt noted having a positive family which is helpful. Pt processed feelings about trying to be more active moving forward. Pt offered community support program but declined. Previous Recommendations: PT will work to become more active and use more physical activity. Pt. Will identify and participate in positive activities to increase self-esteem  Utilize positive supports system  Keep up on self-care  Pt will use daily positive self-talk         MENTAL STATUS EXAM  Mood was constricted with depressed affect. Suicidal ideation was denied. Homicidal ideation was denied. Hygiene was fair . Dress was appropriate. Behavior was Within Normal Limits with No observation of difficulties ambulating. Attitude was Cooperative. Eye-contact was fair. Speech: rate - WNL, rhythm - WNL, volume - WNL. Verbalizations were coherent. Thought processes were intact and goal-oriented with evidence of delusions, hallucinations, obsessions, or paula; with little cognitive distortions. Associations were characterized by normal cognitive processes. Pt was oriented to person, place, time, and general circumstances;  recent:  fair.   Insight and judgment were estimated to be fair, AEB, a fair  understanding of cyclical maladaptive patterns, and the ability to use insight to inform behavior change. ASSESSMENT  Shari Aase presented to the appointment today for evaluation and treatment of symptoms of depression, anxiety. He is currently deemed no risk to himself or others and meets criteria for 1. Major depressive disorder, recurrent episode, moderate with anxious distress ([facility]  Koby was in agreement with recommendations. PHQ Scores 9/14/2022 9/6/2022 8/12/2022 8/7/2019   PHQ2 Score 6 4 0 3   PHQ9 Score 18 13 0 17     Interpretation of Total Score Depression Severity: 1-4 = Minimal depression, 5-9 = Mild depression, 10-14 = Moderate depression, 15-19 = Moderately severe depression, 20-27 = Severe depression    How often pt has had thoughts of death or hurting self (if PHQ positive for depression):       PHYLICIA 7 SCORE 9/14/2022   PHYLICIA-7 Total Score 15     Interpretation of PHYLICIA-7 score: 5-9 = mild anxiety, 10-14 = moderate anxiety, 15+ = severe anxiety. Recommend referral to behavioral health for scores 10 or greater. DIAGNOSIS/PLAN  1. Major depressive disorder, recurrent episode, moderate with anxious distress ([facility]         INTERVENTION  Practiced assertive communication, Provided education, Established rapport, Supportive techniques, and CBT to target depression, anxiety. INTERACTIVE COMPLEXITY  Is interactive complexity present? No  Reason:  N/A  Additional Supporting Information:  N/A     Shari Aase, was evaluated through a synchronous (real-time) audio-video encounter. The patient (or guardian if applicable) is aware that this is a billable service. Verbal consent to proceed has been obtained within the past 12 months. The visit was conducted pursuant to the emergency declaration under the [address] [facility], [address] [place] [address] waiver authority and the [employer] General Act. Patient identification was verified, and a caregiver was present when appropriate.  The patient was located in a state where the provider was credentialed to provide care.      Electronically signed by REENA Gomes on 9/25/2022 at 2:12 PM

## 2022-09-27 ENCOUNTER — TELEMEDICINE (OUTPATIENT)
Dept: BEHAVIORAL/MENTAL HEALTH CLINIC | Age: 27
End: 2022-09-27
Payer: COMMERCIAL

## 2022-09-27 DIAGNOSIS — F33.1 MAJOR DEPRESSIVE DISORDER, RECURRENT EPISODE, MODERATE WITH ANXIOUS DISTRESS (HCC): Primary | ICD-10-CM

## 2022-09-27 PROCEDURE — 90834 PSYTX W PT 45 MINUTES: CPT | Performed by: SOCIAL WORKER

## 2022-09-30 ENCOUNTER — OFFICE VISIT (OUTPATIENT)
Dept: INTERNAL MEDICINE | Age: 27
End: 2022-09-30
Payer: COMMERCIAL

## 2022-09-30 VITALS
WEIGHT: 252 LBS | TEMPERATURE: 98.2 F | HEART RATE: 102 BPM | SYSTOLIC BLOOD PRESSURE: 151 MMHG | DIASTOLIC BLOOD PRESSURE: 94 MMHG | BODY MASS INDEX: 37.33 KG/M2 | HEIGHT: 69 IN | OXYGEN SATURATION: 97 %

## 2022-09-30 DIAGNOSIS — R45.851 SUICIDAL IDEATION: Primary | ICD-10-CM

## 2022-09-30 DIAGNOSIS — F33.2 SEVERE EPISODE OF RECURRENT MAJOR DEPRESSIVE DISORDER, WITHOUT PSYCHOTIC FEATURES (HCC): ICD-10-CM

## 2022-09-30 DIAGNOSIS — Q82.8 PLANTAR KERATOSIS: ICD-10-CM

## 2022-09-30 DIAGNOSIS — I10 HYPERTENSION, UNSPECIFIED TYPE: ICD-10-CM

## 2022-09-30 PROCEDURE — 99211 OFF/OP EST MAY X REQ PHY/QHP: CPT | Performed by: STUDENT IN AN ORGANIZED HEALTH CARE EDUCATION/TRAINING PROGRAM

## 2022-09-30 PROCEDURE — 99214 OFFICE O/P EST MOD 30 MIN: CPT

## 2022-09-30 ASSESSMENT — ENCOUNTER SYMPTOMS
COLOR CHANGE: 0
RHINORRHEA: 0
ABDOMINAL DISTENTION: 0
COUGH: 0
ABDOMINAL PAIN: 0
SHORTNESS OF BREATH: 0

## 2022-09-30 ASSESSMENT — PATIENT HEALTH QUESTIONNAIRE - PHQ9
SUM OF ALL RESPONSES TO PHQ QUESTIONS 1-9: 22
1. LITTLE INTEREST OR PLEASURE IN DOING THINGS: 3
SUM OF ALL RESPONSES TO PHQ QUESTIONS 1-9: 22
3. TROUBLE FALLING OR STAYING ASLEEP: 3
2. FEELING DOWN, DEPRESSED OR HOPELESS: 3
8. MOVING OR SPEAKING SO SLOWLY THAT OTHER PEOPLE COULD HAVE NOTICED. OR THE OPPOSITE, BEING SO FIGETY OR RESTLESS THAT YOU HAVE BEEN MOVING AROUND A LOT MORE THAN USUAL: 2
SUM OF ALL RESPONSES TO PHQ9 QUESTIONS 1 & 2: 6
7. TROUBLE CONCENTRATING ON THINGS, SUCH AS READING THE NEWSPAPER OR WATCHING TELEVISION: 3
5. POOR APPETITE OR OVEREATING: 2
SUM OF ALL RESPONSES TO PHQ QUESTIONS 1-9: 21
9. THOUGHTS THAT YOU WOULD BE BETTER OFF DEAD, OR OF HURTING YOURSELF: 1
4. FEELING TIRED OR HAVING LITTLE ENERGY: 3
6. FEELING BAD ABOUT YOURSELF - OR THAT YOU ARE A FAILURE OR HAVE LET YOURSELF OR YOUR FAMILY DOWN: 2
SUM OF ALL RESPONSES TO PHQ QUESTIONS 1-9: 22

## 2022-09-30 ASSESSMENT — COLUMBIA-SUICIDE SEVERITY RATING SCALE - C-SSRS
6. HAVE YOU EVER DONE ANYTHING, STARTED TO DO ANYTHING, OR PREPARED TO DO ANYTHING TO END YOUR LIFE?: NO
4. HAVE YOU HAD THESE THOUGHTS AND HAD SOME INTENTION OF ACTING ON THEM?: YES
5. HAVE YOU STARTED TO WORK OUT OR WORKED OUT THE DETAILS OF HOW TO KILL YOURSELF? DO YOU INTEND TO CARRY OUT THIS PLAN?: NO
2. HAVE YOU ACTUALLY HAD ANY THOUGHTS OF KILLING YOURSELF?: YES
3. HAVE YOU BEEN THINKING ABOUT HOW YOU MIGHT KILL YOURSELF?: NO
1. WITHIN THE PAST MONTH, HAVE YOU WISHED YOU WERE DEAD OR WISHED YOU COULD GO TO SLEEP AND NOT WAKE UP?: YES

## 2022-09-30 NOTE — PROGRESS NOTES
[facility] [address] [address]  Dept: [phone]  Dept Fax: [phone]    Office Progress/Follow Up Note  Date ofpatient's visit: 9/30/2022  Patient's Name:  Shari Aase YOB: 1995            Patient Care Team:  Mundo Childs MD as PCP - General (Internal Medicine)  ================================================================    REASON FOR VISIT/CHIEF COMPLAINT:  FOLLOW-UP VISIT; Blood Pressure Check, Health Maintenance (Labs reprinted, vaccines declined), and Depression (Phq9 score --22 with positive suicidal screening)    HISTORY OF PRESENTING ILLNESS:  History was obtained from: patient, electronic medical record. Shari Aase is a 33 y.o. who is here for a 6-week follow-up for depression, plantar keratosis and hypertension. Patient today is very depressed, had a PHQ 9 score of 22. Patient states that he has had suicidal ideations for the last 2 weeks that are getting worse. He fears that the suicidal ideations will overtake him and he will act on his suicidal ideation. He has no current plan or guns at home. He does state that if he does not get out of the house he feels like he will harm himself. During this time he goes on walks and \"tries to do something\". The outpatient notes from therapy, for which he was initially referred to, show that his PHQ score has been steadily rising. The patient has not been taking his Zoloft as prescribed. Patient states that he has not picked up the medication from the pharmacy. Blood pressure: 151/94, patient was unable to get outpatient blood pressure cuff due to coverage issues per the patient. Plantar keratosis: Resolved, podiatry removed in outpatient clinic.       Lab Results   Component Value Date    HGB 13.5 05/25/2016    CREATININE 0.89 05/25/2016         Patient Active Problem List   Diagnosis    Keloid    Severe episode of recurrent major depressive disorder, without psychotic features [facility])       Health Maintenance Due   Topic Date Due    HIV screen  Never done    Hepatitis C screen  Never done       No Known Allergies      Current Outpatient Medications   Medication Sig Dispense Refill    sertraline (ZOLOFT) 25 MG tablet Take 1 tablet by mouth daily 90 tablet 1    melatonin 1 MG tablet Take 1 tablet by mouth nightly as needed for Sleep 30 tablet 0     No current facility-administered medications for this visit. Social History     Tobacco Use    Smoking status: Former    Smokeless tobacco: Never   Substance Use Topics    Alcohol use: No    Drug use: No       No family history on file. Past Medical History:   Diagnosis Date    Appendicitis 5/25/2016        Past Surgical History:   Procedure Laterality Date    APPENDECTOMY  5/24/2016    laproscopic        REVIEW OF SYSTEMS:  Review of Systems   Constitutional:  Positive for activity change, appetite change and fatigue. HENT:  Negative for congestion and rhinorrhea. Eyes:  Negative for visual disturbance. Respiratory:  Negative for cough and shortness of breath. Cardiovascular:  Negative for chest pain and leg swelling. Gastrointestinal:  Negative for abdominal distention and abdominal pain. Endocrine: Negative for cold intolerance and heat intolerance. Genitourinary:  Negative for difficulty urinating. Musculoskeletal:  Negative for arthralgias. Skin:  Negative for color change. Allergic/Immunologic: Negative for immunocompromised state. Neurological:  Negative for dizziness. Hematological:  Negative for adenopathy. Psychiatric/Behavioral:  Positive for dysphoric mood and suicidal ideas. The patient is nervous/anxious. Patient is depressed on exam.  Has suicidal ideation. No plan, no guns at home, however, feels like suicidal thoughts are increasing. There is a fear that they will overtake him and he will act on them.      PHYSICAL EXAM:  Vitals:    09/30/22 0812 09/30/22 0817   BP: (!) 151/94 (!) 151/94   Pulse: (!) 101 (!) 102   Temp: 98.2 °F (36.8 °C)    TempSrc: Temporal    SpO2: 97%    Weight: 252 lb (114.3 kg)    Height: 5' 9\" (1.753 m)      BP Readings from Last 3 Encounters:   09/30/22 (!) 151/94   09/09/22 (!) 146/88   08/12/22 (!) 160/94        Physical Exam  Constitutional:       Appearance: He is obese. Comments: Patient looks depressed, disheveled. HENT:      Head: Normocephalic and atraumatic. Right Ear: External ear normal.      Left Ear: External ear normal.      Nose: Nose normal. No congestion. Mouth/Throat:      Mouth: Mucous membranes are moist.      Pharynx: Oropharynx is clear. Eyes:      Conjunctiva/sclera: Conjunctivae normal.      Pupils: Pupils are equal, round, and reactive to light. Cardiovascular:      Rate and Rhythm: Normal rate and regular rhythm. Pulses: Normal pulses. Heart sounds: Normal heart sounds. Pulmonary:      Effort: Pulmonary effort is normal.      Breath sounds: Normal breath sounds. No wheezing. Abdominal:      General: Bowel sounds are normal.      Palpations: Abdomen is soft. Tenderness: There is no abdominal tenderness. Genitourinary:     Comments: No genitourinary complaints. Musculoskeletal:         General: No swelling, tenderness or deformity. Normal range of motion. Cervical back: Normal range of motion. Skin:     General: Skin is dry. Capillary Refill: Capillary refill takes less than 2 seconds. Coloration: Skin is not pale. Comments: Plantar keratosis resolved   Neurological:      General: No focal deficit present. Mental Status: He is alert and oriented to person, place, and time. Psychiatric:      Comments: Depressed mood, suicidal ideations, no plan however fearful that suicidal ideation will over him.          DIAGNOSTIC FINDINGS:  CBC:  Lab Results   Component Value Date/Time    WBC 10.4 05/25/2016 06:09 AM    HGB 13.5 05/25/2016 06:09 AM     05/25/2016 06:09 AM       BMP:    Lab Results   Component Value Date/Time     05/25/2016 06:09 AM    K 3.6 05/25/2016 06:09 AM     05/25/2016 06:09 AM    CO2 21 05/25/2016 06:09 AM    BUN 5 05/25/2016 06:09 AM    CREATININE 0.89 05/25/2016 06:09 AM    GLUCOSE 132 05/25/2016 06:09 AM       HEMOGLOBIN A1C: No results found for: LABA1C    FASTING LIPID PANEL:No results found for: CHOL, HDL, TRIG     Diagnosis Orders   1. Suicidal ideation        2. Severe episode of recurrent major depressive disorder, without psychotic features ([facility]        3. Hypertension, unspecified type        4. Plantar keratosis             ASSESSMENT AND PLAN:    Deandre Lindsay was seen today for depression, blood pressure check and health maintenance. Patient was noticeably depressed with PHQ-9 score of 22. No route cause of depression or plan currently. However, per outpatient notes PHQ 9 score has been steadily increasing. Patient states that he does have a good support system at home, however, still feels like the depression and suicidal ideations are overwhelming him. Patient has not been taking his Zoloft due to difficulty getting to the pharmacy. Patient was sent to [facility] for evaluation and treatment. We will follow-up in 4 weeks to address other acute concerns as well as follow-up on depression/suicidal ideation. Diagnoses and all orders for this visit:    Suicidal ideation  - [facility]: Evaluation and treatment  - Patient worried he will act on suicidal ideation.  - Patient did not have a plan.   - No guns at home    Severe episode of recurrent major depressive disorder, without psychotic features ([facility]  - Patient is noncompliant with Zoloft  - Patient receiving outpatient therapy    Unspecified hypertension:  - Patient is not currently on medications  - Spoke about diet and exercise  - This was not a major concern today, will follow-up in 4 weeks    Plantar keratosis: Resolved  - Podiatry removed in outpatient clinic  - No concerns today on physical exam      FOLLOW UP AND INSTRUCTIONS:  Return in about 4 weeks (around 10/28/2022) for Depression. Koby received counseling on the following healthy behaviors: nutrition, exercise, medication adherence, tobacco cessation, and mental status changes. Discussed use, benefit, and side effects of prescribed medications. Barriers to medication compliance addressed. All patient questions answered. Pt voiced understanding. Patient given educational materials - see patient instructions  Clau Cardona MD   Internal Medicine  9/30/2022, 10:03 AM    This note is created with the assistance of a speech-recognition program. While intending to generate a document that actually reflects the content of thevisit, the document can still have some mistakes which may not have been identified and corrected by editing.

## 2022-10-02 NOTE — PROGRESS NOTES
ADULT BEHAVIORAL HEALTH FOLLOW UP      Visit Date: 10/3/2022  Time of appointment: 9:00am  Time spent with Patient: 40 minutes   This is patient's fifth appointment. Reason for Consult: Depression, anxiety      Referring Provider/PCP:    Jones Duane, MD      Pt provided informed consent for the behavioral health program. Discussed with patient model of service to include the limits of confidentiality (i.e. abuse reporting, suicide intervention, etc.) and short-term intervention focused approach. Pt indicated understanding. Lizy Walter is a 33 y.o. male who presents for follow up of depression, anxiety. Pt processed getting out of the house several times per week. Pt noted he has been trying to walk more often. Staff discussed several recommendations such as [facility] partial hospitalization group and community support group. Pt was encouraged to call [facility] for more information regarding this partial hospitalization group. Pt denied suicidal ideations. Previous Recommendations: Pt will work on positive self talk and will try to increase energy level and positive actions. MENTAL STATUS EXAM  Mood was anxious and constricted with anxious affect. Suicidal ideation was denied. Homicidal ideation was denied. Hygiene was fair . Dress was appropriate. Behavior was Within Normal Limits with No observation of difficulties ambulating. Attitude was Cooperative. Eye-contact was fair. Speech: rate - WNL, rhythm - WNL, volume - WNL. Verbalizations were coherent. Thought processes were intact and goal-oriented with evidence of delusions, hallucinations, obsessions, or paula; without little cognitive distortions. Associations were characterized by intact cognitive processes. Pt was oriented to person, place, time, and general circumstances;  recent:  fair.   Insight and judgment were estimated to be fair, AEB, a fair  understanding of cyclical maladaptive patterns, and the ability to use insight to inform behavior change. ASSESSMENT  Sita Law presented to the appointment today for evaluation and treatment of symptoms of depression, anxiety. He is currently deemed no risk to himself or others and meets criteria for 1. Severe episode of recurrent major depressive disorder, without psychotic features ([facility]   Josselin Pollack was in agreement with recommendations. PHQ Scores 9/30/2022 9/14/2022 9/6/2022 8/12/2022 8/7/2019   PHQ2 Score 6 6 4 0 3   PHQ9 Score 22 18 13 0 17     Interpretation of Total Score Depression Severity: 1-4 = Minimal depression, 5-9 = Mild depression, 10-14 = Moderate depression, 15-19 = Moderately severe depression, 20-27 = Severe depression    How often pt has had thoughts of death or hurting self (if PHQ positive for depression):       PHYLICIA 7 SCORE 9/14/2022   PHYLICIA-7 Total Score 15     Interpretation of PHYLICIA-7 score: 5-9 = mild anxiety, 10-14 = moderate anxiety, 15+ = severe anxiety. Recommend referral to behavioral health for scores 10 or greater. DIAGNOSIS/PLAN  1. Severe episode of recurrent major depressive disorder, without psychotic features ([facility]         INTERVENTION  Practiced assertive communication, Provided education, Discussed potential barriers to change, Established rapport, Supportive techniques, and CBT to target derpession      INTERACTIVE COMPLEXITY  Is interactive complexity present? No  Reason:  N/A  Additional Supporting Information:  N/A     Sita Law, was evaluated through a synchronous (real-time) audio-video encounter. The patient (or guardian if applicable) is aware that this is a billable service. Verbal consent to proceed has been obtained within the past 12 months. The visit was conducted pursuant to the emergency declaration under the [facility], [address] [place] [address] waiver authority and the [employer] General Act.   Patient identification was verified, and a caregiver was present when appropriate. The patient was located in a state where the provider was credentialed to provide care.      Electronically signed by REENA Dye on 10/2/2022 at 6:44 PM

## 2022-10-04 ENCOUNTER — TELEMEDICINE (OUTPATIENT)
Dept: BEHAVIORAL/MENTAL HEALTH CLINIC | Age: 27
End: 2022-10-04
Payer: COMMERCIAL

## 2022-10-04 DIAGNOSIS — F33.2 SEVERE EPISODE OF RECURRENT MAJOR DEPRESSIVE DISORDER, WITHOUT PSYCHOTIC FEATURES (HCC): Primary | ICD-10-CM

## 2022-10-04 PROCEDURE — 90834 PSYTX W PT 45 MINUTES: CPT | Performed by: SOCIAL WORKER

## 2022-10-04 NOTE — PATIENT INSTRUCTIONS
Pt. Will identify and participate in positive activities to increase self-esteem  Utilize positive supports system  Keep up on self-care  Pt will use daily positive self-talk  Pt made plans to call and investigate [facility] partial hospitalization program.

## 2022-10-07 ENCOUNTER — TELEPHONE (OUTPATIENT)
Dept: BEHAVIORAL/MENTAL HEALTH CLINIC | Age: 27
End: 2022-10-07

## 2022-10-07 NOTE — TELEPHONE ENCOUNTER
.Contacted patient today in regards to missed appointment. LM to call back  Was call completed? If not, reason: Call was completed    Does patient want to continue services? If no, and the patient is a new patient, please close the referral. Also, if no, please document reason and route message to provider. yes    Anything the provider should be aware of? If yes, please route call.  No    Reason for Missed Appointment: Unknown

## 2022-10-28 ENCOUNTER — HOSPITAL ENCOUNTER (OUTPATIENT)
Age: 27
Setting detail: SPECIMEN
Discharge: HOME | End: 2022-10-28

## 2022-10-28 ENCOUNTER — OFFICE VISIT (OUTPATIENT)
Dept: INTERNAL MEDICINE | Age: 27
End: 2022-10-28
Payer: COMMERCIAL

## 2022-10-28 VITALS
HEART RATE: 109 BPM | BODY MASS INDEX: 38.22 KG/M2 | TEMPERATURE: 97 F | OXYGEN SATURATION: 98 % | DIASTOLIC BLOOD PRESSURE: 92 MMHG | SYSTOLIC BLOOD PRESSURE: 153 MMHG | HEIGHT: 68 IN | WEIGHT: 252.2 LBS

## 2022-10-28 DIAGNOSIS — Z11.3 SCREEN FOR STD (SEXUALLY TRANSMITTED DISEASE): ICD-10-CM

## 2022-10-28 DIAGNOSIS — E66.09 CLASS 2 OBESITY DUE TO EXCESS CALORIES WITHOUT SERIOUS COMORBIDITY WITH BODY MASS INDEX (BMI) OF 38.0 TO 38.9 IN ADULT: ICD-10-CM

## 2022-10-28 DIAGNOSIS — Z13.220 SCREENING FOR LIPID DISORDERS: ICD-10-CM

## 2022-10-28 DIAGNOSIS — R03.0 ELEVATED BP WITHOUT DIAGNOSIS OF HYPERTENSION: ICD-10-CM

## 2022-10-28 DIAGNOSIS — F33.2 SEVERE EPISODE OF RECURRENT MAJOR DEPRESSIVE DISORDER, WITHOUT PSYCHOTIC FEATURES (HCC): Primary | ICD-10-CM

## 2022-10-28 DIAGNOSIS — R03.0 ELEVATED BLOOD PRESSURE READING: ICD-10-CM

## 2022-10-28 DIAGNOSIS — F41.9 ANXIETY: ICD-10-CM

## 2022-10-28 PROBLEM — E66.812 CLASS 2 OBESITY DUE TO EXCESS CALORIES WITHOUT SERIOUS COMORBIDITY WITH BODY MASS INDEX (BMI) OF 38.0 TO 38.9 IN ADULT: Status: ACTIVE | Noted: 2022-10-28

## 2022-10-28 LAB
ANION GAP SERPL CALCULATED.3IONS-SCNC: 14 MMOL/L (ref 9–17)
BUN BLDV-MCNC: 12 MG/DL (ref 6–20)
CALCIUM SERPL-MCNC: 9.6 MG/DL (ref 8.6–10.4)
CHLORIDE BLD-SCNC: 107 MMOL/L (ref 98–107)
CHOLESTEROL/HDL RATIO: 4.6
CHOLESTEROL: 151 MG/DL
CO2: 21 MMOL/L (ref 20–31)
CREAT SERPL-MCNC: 0.88 MG/DL (ref 0.7–1.2)
GFR SERPL CREATININE-BSD FRML MDRD: >60 ML/MIN/1.73M2
GLUCOSE BLD-MCNC: 84 MG/DL (ref 70–99)
HDLC SERPL-MCNC: 33 MG/DL
HIV AG/AB: NONREACTIVE
LDL CHOLESTEROL: 105 MG/DL (ref 0–130)
POTASSIUM SERPL-SCNC: 4.7 MMOL/L (ref 3.7–5.3)
SODIUM BLD-SCNC: 142 MMOL/L (ref 135–144)
TRIGL SERPL-MCNC: 65 MG/DL

## 2022-10-28 PROCEDURE — 1036F TOBACCO NON-USER: CPT

## 2022-10-28 PROCEDURE — 99211 OFF/OP EST MAY X REQ PHY/QHP: CPT | Performed by: STUDENT IN AN ORGANIZED HEALTH CARE EDUCATION/TRAINING PROGRAM

## 2022-10-28 PROCEDURE — 99213 OFFICE O/P EST LOW 20 MIN: CPT

## 2022-10-28 PROCEDURE — G8484 FLU IMMUNIZE NO ADMIN: HCPCS

## 2022-10-28 PROCEDURE — G8417 CALC BMI ABV UP PARAM F/U: HCPCS

## 2022-10-28 PROCEDURE — G8427 DOCREV CUR MEDS BY ELIG CLIN: HCPCS

## 2022-10-28 RX ORDER — MIRTAZAPINE 15 MG/1
15 TABLET, FILM COATED ORAL NIGHTLY
COMMUNITY
Start: 2022-10-19

## 2022-10-28 RX ORDER — VENLAFAXINE HYDROCHLORIDE 75 MG/1
75 CAPSULE, EXTENDED RELEASE ORAL DAILY
COMMUNITY
Start: 2022-10-19

## 2022-10-28 ASSESSMENT — ENCOUNTER SYMPTOMS
ABDOMINAL DISTENTION: 0
APNEA: 0
COLOR CHANGE: 0

## 2022-10-28 NOTE — PATIENT INSTRUCTIONS
Please take blood pressure every morning and record. Bring log & blood pressure cuff to follow up visit in 1 month. Please get labs completed today.

## 2022-10-28 NOTE — PROGRESS NOTES
[facility] [address] [address]  Dept: [phone]  Dept Fax: [phone]    Office Progress/Follow Up Note  Date ofpatient's visit: 10/28/2022  Patient's Name:  Helga Apley YOB: 1995            Patient Care Team:  Jones Duane, MD as PCP - General (Internal Medicine)  ================================================================    REASON FOR VISIT/CHIEF COMPLAINT:  FOLLOW-UP VISIT; Depression (Pt states he feeling good today, pt states he did not take medication today)    HISTORY OF PRESENTING ILLNESS:  History was obtained from: patient, electronic medical record. Koby Rodriguez a 33 y.o. is here for a follow-up visit regarding suicidal ideation and depression. Patient states that since he went to the [facility] and was started on mirtazapine and venlafaxine approximately a month ago the patient has had an improvement in his mentation has no longer had suicidal ideation and depression is very manageable. Patient states that depression gets better each day and has not been feeling as anxious at home. Patient states that he is compliant with medications however he has not taken them today but will when he returns home. PHQ-9 score today was 10. Patient's blood pressure was again elevated today 153/92. Patient states that he takes his blood pressure at home however does not remember the reading sometimes they are high and sometimes very low per the patient. He was given education on how to check his blood pressure and record it. We will continue to follow-up. Patient's BMI is 38.35, class II obesity. Patient states that his diet is very unhealthy and he does not exercise. He was educated on exercising as well as a referral to dietary.         Smoking:   Alcohol:   Illicit Drug use:  Occupation:   COVID:    Lab Results   Component Value Date    HGB 13.5 05/25/2016    CREATININE 0.89 05/25/2016         Patient Active Problem List   Diagnosis    Keloid    Severe episode of recurrent major depressive disorder, without psychotic features (HCC)    Elevated blood pressure reading    Class 2 obesity due to excess calories without serious comorbidity with body mass index (BMI) of 38.0 to 38.9 in adult    Anxiety       Health Maintenance Due   Topic Date Due    HIV screen  Never done    Hepatitis C screen  Never done       No Known Allergies      Current Outpatient Medications   Medication Sig Dispense Refill    mirtazapine (REMERON) 15 MG tablet Take 15 mg by mouth nightly      venlafaxine (EFFEXOR XR) 75 MG extended release capsule Take 75 mg by mouth daily      melatonin 1 MG tablet Take 1 tablet by mouth nightly as needed for Sleep 30 tablet 0     No current facility-administered medications for this visit. Social History     Tobacco Use    Smoking status: Former    Smokeless tobacco: Never   Substance Use Topics    Alcohol use: No    Drug use: No       No family history on file. Past Medical History:   Diagnosis Date    Appendicitis 5/25/2016        Past Surgical History:   Procedure Laterality Date    APPENDECTOMY  5/24/2016    laproscopic        REVIEW OF SYSTEMS:  Review of Systems   Constitutional:  Negative for activity change and fever. HENT:  Negative for congestion. Eyes:  Negative for visual disturbance. Respiratory:  Negative for apnea. Cardiovascular:  Negative for chest pain. Gastrointestinal:  Negative for abdominal distention. Endocrine: Negative for polyuria. Genitourinary:  Negative for difficulty urinating. Musculoskeletal:  Negative for arthralgias. Skin:  Negative for color change. Allergic/Immunologic: Negative for immunocompromised state. Neurological:  Negative for dizziness. Hematological:  Negative for adenopathy. Psychiatric/Behavioral:  Negative for agitation. Patient states that depression and anxiety are better.   Patient does have mild depression and anxiety at times. States these episodes are also resolving. PHYSICAL EXAM:  Vitals:    10/28/22 0939 10/28/22 0944   BP: (!) 164/91 (!) 153/92   Site: Left Upper Arm    Position: Sitting    Cuff Size: Large Adult    Pulse: (!) 117 (!) 109   Temp: 97 °F (36.1 °C)    SpO2: 98%    Weight: 252 lb 3.2 oz (114.4 kg)    Height: 5' 8\" (1.727 m)      BP Readings from Last 3 Encounters:   10/28/22 (!) 153/92   09/30/22 (!) 151/94   09/09/22 (!) 146/88        Physical Exam  Constitutional:       General: He is not in acute distress. Appearance: He is obese. HENT:      Head: Normocephalic and atraumatic. Right Ear: External ear normal.      Left Ear: External ear normal.      Nose: Nose normal. No congestion. Mouth/Throat:      Mouth: Mucous membranes are moist.      Pharynx: Oropharynx is clear. Eyes:      Extraocular Movements: Extraocular movements intact. Conjunctiva/sclera: Conjunctivae normal.   Cardiovascular:      Rate and Rhythm: Tachycardia present. Pulses: Normal pulses. Heart sounds: Normal heart sounds. Comments: 109  Pulmonary:      Effort: Pulmonary effort is normal.      Breath sounds: Normal breath sounds. No wheezing. Abdominal:      General: Bowel sounds are normal. There is no distension. Palpations: Abdomen is soft. Musculoskeletal:         General: No swelling. Normal range of motion. Cervical back: Normal range of motion. No rigidity. Skin:     General: Skin is dry. Capillary Refill: Capillary refill takes less than 2 seconds. Coloration: Skin is not jaundiced. Neurological:      General: No focal deficit present. Mental Status: He is alert and oriented to person, place, and time. Mental status is at baseline.    Psychiatric:         Mood and Affect: Mood normal.         Behavior: Behavior normal.         DIAGNOSTIC FINDINGS:  CBC:  Lab Results   Component Value Date/Time    WBC 10.4 05/25/2016 06:09 AM    HGB 13.5 05/25/2016 06:09 AM  05/25/2016 06:09 AM       BMP:    Lab Results   Component Value Date/Time     05/25/2016 06:09 AM    K 3.6 05/25/2016 06:09 AM     05/25/2016 06:09 AM    CO2 21 05/25/2016 06:09 AM    BUN 5 05/25/2016 06:09 AM    CREATININE 0.89 05/25/2016 06:09 AM    GLUCOSE 132 05/25/2016 06:09 AM       HEMOGLOBIN A1C: No results found for: LABA1C    FASTING LIPID PANEL:No results found for: CHOL, HDL, TRIG     Diagnosis Orders   1. Severe episode of recurrent major depressive disorder, without psychotic features ([facility]        2. Elevated blood pressure reading        3. Class 2 obesity due to excess calories without serious comorbidity with body mass index (BMI) of 38.0 to 38.9 in adult  [address] [place] [address] Rd      4. Anxiety             ASSESSMENT AND PLAN:    Parish Cook was seen today for depression. Patient states that his depression has gotten better over the last month since starting new medications mirtazapine and venlafaxine. Patient still has high blood pressure today 153/92 we will continue to follow and have the patient monitor his blood pressure outpatient and follow-up in 4 weeks. Patient was also given dietary modifications as well as exercise recommendations due to patient's obesity. Diagnoses and all orders for this visit:    Severe episode of recurrent major depressive disorder, without psychotic features ([facility]  - Patient is compliant with mirtazapine and venlafaxine  - No suicidal ideations  - Patient receiving outpatient therapy  - PHQ-9 score: 10    Unspecified hypertension:  - Blood pressure: 153/92  - Patient is not currently on medications  - Spoke with patient about recording blood pressures at home as well as bringing in blood pressure cuff in 1 month. - Consider sleep study in the future.   - will follow-up in 4 weeks    Class 2 obesity due to excess calories without serious comorbidity with body mass index (BMI) of 38.0 to 38.9 in adult  [facility]- [address] with patient about diet and exercise      FOLLOW UP AND INSTRUCTIONS:  Return in about 4 weeks (around 11/25/2022) for Elevated BP. Koby received counseling on the following healthy behaviors: nutrition, exercise, and medication adherence    Discussed use, benefit, and side effects of prescribed medications. Barriers to medication compliance addressed. All patient questions answered. Pt voiced understanding. Patient given educational materials - see patient instructions    Shyann Lucas MD   Internal Medicine  10/28/2022, 10:32 AM    This note is created with the assistance of a speech-recognition program. While intending to generate a document that actually reflects the content of thevisit, the document can still have some mistakes which may not have been identified and corrected by editing.

## 2022-10-29 LAB — HEPATITIS C ANTIBODY: NONREACTIVE

## 2022-10-31 ENCOUNTER — TELEPHONE (OUTPATIENT)
Dept: INTERNAL MEDICINE | Age: 27
End: 2022-10-31

## 2022-10-31 DIAGNOSIS — I10 HYPERTENSION, UNSPECIFIED TYPE: Primary | ICD-10-CM

## 2022-10-31 RX ORDER — ADHESIVE BANDAGE 3/4"
BANDAGE TOPICAL
Refills: 0 | Status: CANCELLED | OUTPATIENT
Start: 2022-10-31

## 2022-10-31 NOTE — TELEPHONE ENCOUNTER
Pt had a appt on 10/28/22, pt needed to bring his B/P reading for his next appt on 11/25/22, but pt do not have a B/P Cuff at home

## 2022-10-31 NOTE — TELEPHONE ENCOUNTER
Lab called they were not able to process urine. Pt will need to give another sample of urine. Unable to LM on pt VM as VM is not set up.

## 2022-10-31 NOTE — TELEPHONE ENCOUNTER
PC to pt-- spoke with him and let him know that he will need to give another urine sample.  Pt voiced understanding

## 2022-11-04 ENCOUNTER — TELEPHONE (OUTPATIENT)
Dept: INTERNAL MEDICINE | Age: 27
End: 2022-11-04

## 2023-01-20 ENCOUNTER — TELEPHONE (OUTPATIENT)
Dept: INTERNAL MEDICINE | Age: 28
End: 2023-01-20

## 2023-09-25 ENCOUNTER — TELEPHONE (OUTPATIENT)
Dept: INTERNAL MEDICINE | Age: 28
End: 2023-09-25

## 2023-10-06 ENCOUNTER — OFFICE VISIT (OUTPATIENT)
Dept: INTERNAL MEDICINE | Age: 28
End: 2023-10-06
Payer: COMMERCIAL

## 2023-10-06 VITALS
HEART RATE: 98 BPM | TEMPERATURE: 99.1 F | SYSTOLIC BLOOD PRESSURE: 140 MMHG | WEIGHT: 246.4 LBS | OXYGEN SATURATION: 98 % | DIASTOLIC BLOOD PRESSURE: 80 MMHG | HEIGHT: 68 IN | BODY MASS INDEX: 37.35 KG/M2

## 2023-10-06 DIAGNOSIS — E66.09 CLASS 2 OBESITY DUE TO EXCESS CALORIES WITHOUT SERIOUS COMORBIDITY WITH BODY MASS INDEX (BMI) OF 37.0 TO 37.9 IN ADULT: ICD-10-CM

## 2023-10-06 DIAGNOSIS — K02.9 PAIN DUE TO DENTAL CARIES: ICD-10-CM

## 2023-10-06 DIAGNOSIS — F33.42 RECURRENT MAJOR DEPRESSIVE DISORDER, IN FULL REMISSION (HCC): ICD-10-CM

## 2023-10-06 DIAGNOSIS — I10 HYPERTENSION, UNSPECIFIED TYPE: Primary | ICD-10-CM

## 2023-10-06 PROBLEM — R03.0 ELEVATED BLOOD PRESSURE READING: Status: RESOLVED | Noted: 2022-10-28 | Resolved: 2023-10-06

## 2023-10-06 PROBLEM — F33.2 SEVERE EPISODE OF RECURRENT MAJOR DEPRESSIVE DISORDER, WITHOUT PSYCHOTIC FEATURES (HCC): Status: RESOLVED | Noted: 2022-09-06 | Resolved: 2023-10-06

## 2023-10-06 PROCEDURE — 3078F DIAST BP <80 MM HG: CPT

## 2023-10-06 PROCEDURE — G8417 CALC BMI ABV UP PARAM F/U: HCPCS

## 2023-10-06 PROCEDURE — G8427 DOCREV CUR MEDS BY ELIG CLIN: HCPCS

## 2023-10-06 PROCEDURE — 3074F SYST BP LT 130 MM HG: CPT

## 2023-10-06 PROCEDURE — G8484 FLU IMMUNIZE NO ADMIN: HCPCS

## 2023-10-06 PROCEDURE — 1036F TOBACCO NON-USER: CPT

## 2023-10-06 PROCEDURE — 99213 OFFICE O/P EST LOW 20 MIN: CPT

## 2023-10-06 RX ORDER — HYDROCHLOROTHIAZIDE 25 MG/1
25 TABLET ORAL EVERY MORNING
Qty: 90 TABLET | Refills: 1 | Status: SHIPPED | OUTPATIENT
Start: 2023-10-06

## 2023-10-06 ASSESSMENT — ENCOUNTER SYMPTOMS
SHORTNESS OF BREATH: 0
ABDOMINAL DISTENTION: 0
ABDOMINAL PAIN: 0
COLOR CHANGE: 0

## 2023-10-06 ASSESSMENT — PATIENT HEALTH QUESTIONNAIRE - PHQ9
SUM OF ALL RESPONSES TO PHQ9 QUESTIONS 1 & 2: 0
4. FEELING TIRED OR HAVING LITTLE ENERGY: 0
1. LITTLE INTEREST OR PLEASURE IN DOING THINGS: 0
SUM OF ALL RESPONSES TO PHQ QUESTIONS 1-9: 0
5. POOR APPETITE OR OVEREATING: 0
SUM OF ALL RESPONSES TO PHQ QUESTIONS 1-9: 0
10. IF YOU CHECKED OFF ANY PROBLEMS, HOW DIFFICULT HAVE THESE PROBLEMS MADE IT FOR YOU TO DO YOUR WORK, TAKE CARE OF THINGS AT HOME, OR GET ALONG WITH OTHER PEOPLE: 0
SUM OF ALL RESPONSES TO PHQ QUESTIONS 1-9: 0
2. FEELING DOWN, DEPRESSED OR HOPELESS: 0
7. TROUBLE CONCENTRATING ON THINGS, SUCH AS READING THE NEWSPAPER OR WATCHING TELEVISION: 0
9. THOUGHTS THAT YOU WOULD BE BETTER OFF DEAD, OR OF HURTING YOURSELF: 0
3. TROUBLE FALLING OR STAYING ASLEEP: 0
SUM OF ALL RESPONSES TO PHQ QUESTIONS 1-9: 0
6. FEELING BAD ABOUT YOURSELF - OR THAT YOU ARE A FAILURE OR HAVE LET YOURSELF OR YOUR FAMILY DOWN: 0
8. MOVING OR SPEAKING SO SLOWLY THAT OTHER PEOPLE COULD HAVE NOTICED. OR THE OPPOSITE, BEING SO FIGETY OR RESTLESS THAT YOU HAVE BEEN MOVING AROUND A LOT MORE THAN USUAL: 0

## 2023-10-06 NOTE — PROGRESS NOTES
[facility]  [facility] [address]   [address] [address]  Dept: [phone]  Dept Fax: [phone]    Office Progress/Follow Up Note  Date ofpatient's visit: 10/6/2023  Patient's Name:  Tonya Chacon YOB: 1995            Patient Care Team:  Della Cline MD as PCP - General (Internal Medicine)  ================================================================    REASON FOR VISIT/CHIEF COMPLAINT:  FOLLOW-UP VISIT; Hypertension    HISTORY OF PRESENTING ILLNESS:  History was obtained from: patient, electronic medical record. Koby mcdonough 33 y.o. is here for a follow-up visit regarding hypertension and depression. Hypertension: Patient's blood pressure today is 140/80, patient states that his at home readings are lower 120/90. Patient states that he gets nervous every time he comes in the office however he has had repeated episodes of hypertension in the office. Obesity: Patient states that he is beginning to exercise more and is having difficulty with diet. Patient states that he is continually working on eating healthier and exercising more. Patient has lost 6 pounds since previous visit. Depression: Patient has had severe episodes of recurrent major depressive disorder without psychotic features in the past.  Patient's PHQ-9 score today is 0. Patient states that he has been taking his medications however it has been intermittently. Patient is currently on mirtazapine and venlafaxine. Smoking: Not currently smoking  Alcohol: None  Illicit Drug use:  occasionally  Occupation: None  COVID: None    Health maintenance:  Colon cancer: Starts at 40. If family Hx of CRC then needs to start at 36 or 10 years before the age family got it. Lung cancer screenin-80 with 20ppy hx who quit <15 years ago. Annual low dose ct until 15+ years from quitting   AAA: Men age 72 who have ever smoked.  Once  Depression screening: PHQ-9: 1 0  HCV:

## 2023-11-17 ENCOUNTER — OFFICE VISIT (OUTPATIENT)
Dept: INTERNAL MEDICINE | Age: 28
End: 2023-11-17
Payer: COMMERCIAL

## 2023-11-17 VITALS
WEIGHT: 240.6 LBS | DIASTOLIC BLOOD PRESSURE: 78 MMHG | HEIGHT: 68 IN | OXYGEN SATURATION: 98 % | SYSTOLIC BLOOD PRESSURE: 138 MMHG | TEMPERATURE: 97.2 F | HEART RATE: 82 BPM | BODY MASS INDEX: 36.46 KG/M2

## 2023-11-17 DIAGNOSIS — E66.09 CLASS 2 OBESITY DUE TO EXCESS CALORIES WITHOUT SERIOUS COMORBIDITY WITH BODY MASS INDEX (BMI) OF 36.0 TO 36.9 IN ADULT: ICD-10-CM

## 2023-11-17 DIAGNOSIS — I10 PRIMARY HYPERTENSION: Primary | ICD-10-CM

## 2023-11-17 DIAGNOSIS — F33.42 RECURRENT MAJOR DEPRESSIVE DISORDER, IN FULL REMISSION (HCC): ICD-10-CM

## 2023-11-17 PROBLEM — E66.812 CLASS 2 OBESITY DUE TO EXCESS CALORIES WITHOUT SERIOUS COMORBIDITY WITH BODY MASS INDEX (BMI) OF 36.0 TO 36.9 IN ADULT: Status: ACTIVE | Noted: 2023-11-17

## 2023-11-17 PROCEDURE — G8484 FLU IMMUNIZE NO ADMIN: HCPCS

## 2023-11-17 PROCEDURE — 3074F SYST BP LT 130 MM HG: CPT

## 2023-11-17 PROCEDURE — G8427 DOCREV CUR MEDS BY ELIG CLIN: HCPCS

## 2023-11-17 PROCEDURE — 99213 OFFICE O/P EST LOW 20 MIN: CPT

## 2023-11-17 PROCEDURE — 3078F DIAST BP <80 MM HG: CPT

## 2023-11-17 PROCEDURE — G8417 CALC BMI ABV UP PARAM F/U: HCPCS

## 2023-11-17 PROCEDURE — 4004F PT TOBACCO SCREEN RCVD TLK: CPT

## 2023-11-17 PROCEDURE — 99211 OFF/OP EST MAY X REQ PHY/QHP: CPT

## 2023-11-17 RX ORDER — HYDROCHLOROTHIAZIDE 25 MG/1
25 TABLET ORAL EVERY MORNING
Qty: 90 TABLET | Refills: 1 | Status: SHIPPED | OUTPATIENT
Start: 2023-11-17

## 2023-11-17 SDOH — ECONOMIC STABILITY: FOOD INSECURITY: WITHIN THE PAST 12 MONTHS, YOU WORRIED THAT YOUR FOOD WOULD RUN OUT BEFORE YOU GOT MONEY TO BUY MORE.: NEVER TRUE

## 2023-11-17 SDOH — ECONOMIC STABILITY: FOOD INSECURITY: WITHIN THE PAST 12 MONTHS, THE FOOD YOU BOUGHT JUST DIDN'T LAST AND YOU DIDN'T HAVE MONEY TO GET MORE.: NEVER TRUE

## 2023-11-17 SDOH — ECONOMIC STABILITY: INCOME INSECURITY: HOW HARD IS IT FOR YOU TO PAY FOR THE VERY BASICS LIKE FOOD, HOUSING, MEDICAL CARE, AND HEATING?: NOT HARD AT ALL

## 2023-11-17 SDOH — ECONOMIC STABILITY: HOUSING INSECURITY
IN THE LAST 12 MONTHS, WAS THERE A TIME WHEN YOU DID NOT HAVE A STEADY PLACE TO SLEEP OR SLEPT IN A SHELTER (INCLUDING NOW)?: NO

## 2023-11-17 ASSESSMENT — PATIENT HEALTH QUESTIONNAIRE - PHQ9
SUM OF ALL RESPONSES TO PHQ QUESTIONS 1-9: 0
8. MOVING OR SPEAKING SO SLOWLY THAT OTHER PEOPLE COULD HAVE NOTICED. OR THE OPPOSITE, BEING SO FIGETY OR RESTLESS THAT YOU HAVE BEEN MOVING AROUND A LOT MORE THAN USUAL: 0
4. FEELING TIRED OR HAVING LITTLE ENERGY: 0
9. THOUGHTS THAT YOU WOULD BE BETTER OFF DEAD, OR OF HURTING YOURSELF: 0
6. FEELING BAD ABOUT YOURSELF - OR THAT YOU ARE A FAILURE OR HAVE LET YOURSELF OR YOUR FAMILY DOWN: 0
3. TROUBLE FALLING OR STAYING ASLEEP: 0
SUM OF ALL RESPONSES TO PHQ9 QUESTIONS 1 & 2: 0
2. FEELING DOWN, DEPRESSED OR HOPELESS: 0
SUM OF ALL RESPONSES TO PHQ QUESTIONS 1-9: 0
5. POOR APPETITE OR OVEREATING: 0
SUM OF ALL RESPONSES TO PHQ QUESTIONS 1-9: 0
1. LITTLE INTEREST OR PLEASURE IN DOING THINGS: 0
SUM OF ALL RESPONSES TO PHQ QUESTIONS 1-9: 0
10. IF YOU CHECKED OFF ANY PROBLEMS, HOW DIFFICULT HAVE THESE PROBLEMS MADE IT FOR YOU TO DO YOUR WORK, TAKE CARE OF THINGS AT HOME, OR GET ALONG WITH OTHER PEOPLE: 0
7. TROUBLE CONCENTRATING ON THINGS, SUCH AS READING THE NEWSPAPER OR WATCHING TELEVISION: 0

## 2023-11-17 ASSESSMENT — COLUMBIA-SUICIDE SEVERITY RATING SCALE - C-SSRS
7. DID THIS OCCUR IN THE LAST THREE MONTHS: NO
4. HAVE YOU HAD THESE THOUGHTS AND HAD SOME INTENTION OF ACTING ON THEM?: NO
3. HAVE YOU BEEN THINKING ABOUT HOW YOU MIGHT KILL YOURSELF?: NO
5. HAVE YOU STARTED TO WORK OUT OR WORKED OUT THE DETAILS OF HOW TO KILL YOURSELF? DO YOU INTEND TO CARRY OUT THIS PLAN?: NO

## 2023-11-17 ASSESSMENT — ENCOUNTER SYMPTOMS
ABDOMINAL DISTENTION: 0
COLOR CHANGE: 0
WHEEZING: 0
SHORTNESS OF BREATH: 0

## 2024-04-17 ENCOUNTER — HOSPITAL ENCOUNTER (EMERGENCY)
Age: 29
Discharge: HOME | End: 2024-04-17
Attending: EMERGENCY MEDICINE
Payer: COMMERCIAL

## 2024-04-17 VITALS
OXYGEN SATURATION: 100 % | SYSTOLIC BLOOD PRESSURE: 161 MMHG | DIASTOLIC BLOOD PRESSURE: 99 MMHG | TEMPERATURE: 97.9 F | HEART RATE: 60 BPM | RESPIRATION RATE: 18 BRPM

## 2024-04-17 DIAGNOSIS — S61.211A LACERATION OF LEFT INDEX FINGER WITHOUT FOREIGN BODY WITHOUT DAMAGE TO NAIL, INITIAL ENCOUNTER: Primary | ICD-10-CM

## 2024-04-17 PROCEDURE — 2500000003 HC RX 250 WO HCPCS

## 2024-04-17 PROCEDURE — 99283 EMERGENCY DEPT VISIT LOW MDM: CPT | Performed by: EMERGENCY MEDICINE

## 2024-04-17 PROCEDURE — 12001 RPR S/N/AX/GEN/TRNK 2.5CM/<: CPT | Performed by: EMERGENCY MEDICINE

## 2024-04-17 RX ORDER — LIDOCAINE HYDROCHLORIDE 10 MG/ML
20 INJECTION, SOLUTION INFILTRATION; PERINEURAL ONCE
Status: COMPLETED | OUTPATIENT
Start: 2024-04-17 | End: 2024-04-17

## 2024-04-17 RX ORDER — CEPHALEXIN 500 MG/1
500 CAPSULE ORAL 4 TIMES DAILY
Qty: 28 CAPSULE | Refills: 0 | Status: SHIPPED | OUTPATIENT
Start: 2024-04-17 | End: 2024-04-24

## 2024-04-17 RX ADMIN — LIDOCAINE HYDROCHLORIDE 20 ML: 10 INJECTION, SOLUTION INFILTRATION; PERINEURAL at 18:14

## 2024-04-17 ASSESSMENT — PAIN - FUNCTIONAL ASSESSMENT: PAIN_FUNCTIONAL_ASSESSMENT: NONE - DENIES PAIN

## 2024-04-17 ASSESSMENT — ENCOUNTER SYMPTOMS
SHORTNESS OF BREATH: 0
COUGH: 0
CHOKING: 0

## 2024-04-17 NOTE — DISCHARGE INSTRUCTIONS
-Please follow-up with your PCP in next 7 to 10 days, sutures can be taken out on day 10, you can come back to the ED for suture removal or you can get it done through your PCP.  -Complete your course of prescribed antibiotics for the next 7 days  -Keep the wound clean.  -Residual lotion regularly, foul-smelling percent discharge from the wound, develop fever of 101 or more, you have developed any new weakness in the finger or left hand, or your hand or finger start strength blue.

## 2024-04-17 NOTE — ED PROVIDER NOTES
[facility] ED  Emergency Department Encounter  Emergency Medicine Resident     Pt Name:Koby Aleman  MRN: [id number]  Birthdate 1995  Date of evaluation: 4/17/24  PCP:  Adrian Flores MD  Note Started: 6:11 PM EDT      CHIEF COMPLAINT       Chief Complaint   Patient presents with    Laceration     L index finger, bleeding controlled       HISTORY OF PRESENT ILLNESS  (Location/Symptom, Timing/Onset, Context/Setting, Quality, Duration, Modifying Factors, Severity.)      Koby Aleman is a 29-year-old male presenting with a laceration on index finger of right hand he said that he was trying to cut something with a cutting knife, in the process cut his finger.    He recently quit smoking, he does not use any other recreational drugs, he takes blood pressure medication, but did not take them today.    PAST MEDICAL / SURGICAL / SOCIAL / FAMILY HISTORY      has a past medical history of Appendicitis, Elevated blood pressure reading, and Severe episode of recurrent major depressive disorder, without psychotic features (HCC).       has a past surgical history that includes Appendectomy (5/24/2016).      Social History     Socioeconomic History    Marital status: Single     Spouse name: Not on file    Number of children: Not on file    Years of education: Not on file    Highest education level: Not on file   Occupational History    Not on file   Tobacco Use    Smoking status: Every Day     Current packs/day: 0.25     Average packs/day: 0.3 packs/day for 1 year (0.3 ttl pk-yrs)     Types: Cigarettes    Smokeless tobacco: Never    Tobacco comments:     Smoking 6 cigarettes a day   Vaping Use    Vaping Use: Some days    Substances: flavoring   Substance and Sexual Activity    Alcohol use: No    Drug use: No    Sexual activity: Yes   Other Topics Concern    Not on file   Social History Narrative    Not on file     Social Determinants of Health     Financial Resource Strain: Low Risk  (11/17/2023)

## 2024-04-17 NOTE — ED PROVIDER NOTES
[facility]     Emergency Department     Faculty Note/ Attestation      Pt Name: Koby Aleman                                       MRN: [id number]  Birthdate 1995  Date of evaluation: 4/17/2024  Note Started: 6:49 PM EDT    Patients PCP:    Adrian Flores MD    Attestation  I performed a history and physical examination of the patient and discussed management with the resident. I reviewed the resident’s note and agree with the documented findings and plan of care. Any areas of disagreement are noted on the chart. I was personally present for the key portions of any procedures. I have documented in the chart those procedures where I was not present during the key portions. I have reviewed the emergency nurses triage note. I agree with the chief complaint, past medical history, past surgical history, allergies, medications, social and family history as documented unless otherwise noted below.    For Physician Assistant/ Nurse Practitioner cases/documentation I have personally evaluated this patient and have completed at least one if not all key elements of the E/M (history, physical exam, and MDM). Additional findings are as noted.    Initial Screens:             Vitals:    Vitals:    04/17/24 1637 04/17/24 1638   BP:  (!) 161/99   Pulse: 60    Resp:  18   Temp: 97.9 °F (36.6 °C)    TempSrc: Oral    SpO2: 100%        CHIEF COMPLAINT       Chief Complaint   Patient presents with    Laceration     L index finger, bleeding controlled     Patient is a 29-year-old male who was at home cutting some watermelon and injured his nondominant left index finger on the thumb side With a kitchen knife.  There is no contaminants it was a clean knife no fevers no chills no nausea vomiting      EMERGENCY DEPARTMENT COURSE:     -------------------------  BP: (!) 161/99, Temp: 97.9 °F (36.6 °C), Pulse: 60, Respirations: 18  Physical Exam __  Constitutional:  oriented to person, place, and time.  appears  well-developed and well-nourished.   HENT: no facial swelling or edema  Head: Normocephalic and atraumatic.   Eyes: Right eye exhibits no discharge. Left eye exhibits no discharge. No scleral icterus.   Neck: No JVD present. No tracheal deviation present.   Cardiovascular: pulses present in extremities  Pulmonary/Chest: Effort normal. No respiratory distress.   Musculoskeletal: Normal range of motion. exhibits no edema.   Neurological: they are alert and oriented to person, place, and time.  Skin: 2cm laceration to the L 2nd digit along the thumb side bleeding controlled with pressure  Psychiatric: has a normal mood and affect.  behavior is normal.      Comments  Medical Decision Making  Risk  Prescription drug management.    Patient will need copious irrigation with a liter of fluids under pressure patient has no risk factors such as diabetes for poor wound healing after copious irrigation no signs of inspection of foreign bodies or infection patient was closed with 4 sutures simple interrupted bacitracin and dressing applied      ED Course as of 04/17/24 1849 Wed Apr 17, 2024 1806 29-year-old male presenting with a laceration on index finger of right hand he said that he was trying to cut something with a cutting knife, in the process cut his finger.    He recently quit smoking, he does not use any other recreational drugs, he takes blood pressure medication, but did not take them today.    Physical examination: Refer to media for actual objective board, movements of the hand and fingers intact,     clinical impression: Laceration of index finger  Plan: Repair with sutures after digital block   [FK]      ED Course User Index  [FK] Adriano Kolb MD William Krebs, DO, [facility].  Attending Emergency Physician         aMtheus Travis DO  04/17/24 1500

## 2024-04-18 DIAGNOSIS — I10 PRIMARY HYPERTENSION: ICD-10-CM

## 2024-04-18 NOTE — TELEPHONE ENCOUNTER
----- Message from Nelly Yang sent at 4/18/2024 10:41 AM EDT -----  Subject: Refill Request    QUESTIONS  Name of Medication? hydroCHLOROthiazide (HYDRODIURIL) 25 MG tablet  Patient-reported dosage and instructions? once a day   How many days do you have left? 0  Preferred Pharmacy? [facility] [id number]  Pharmacy phone number (if available)? [phone]  ---------------------------------------------------------------------------  --------------  CALL BACK INFO  What is the best way for the office to contact you? OK to leave message on   voicemail  Preferred Call Back Phone Number? [phone]  ---------------------------------------------------------------------------  --------------  SCRIPT ANSWERS  Relationship to Patient? Self

## 2024-04-19 DIAGNOSIS — I10 PRIMARY HYPERTENSION: Primary | ICD-10-CM

## 2024-04-19 RX ORDER — HYDROCHLOROTHIAZIDE 25 MG/1
25 TABLET ORAL EVERY MORNING
Qty: 90 TABLET | Refills: 1 | OUTPATIENT
Start: 2024-04-19

## 2024-05-10 ENCOUNTER — OFFICE VISIT (OUTPATIENT)
Dept: INTERNAL MEDICINE | Age: 29
End: 2024-05-10
Payer: COMMERCIAL

## 2024-05-10 ENCOUNTER — TELEPHONE (OUTPATIENT)
Dept: INTERNAL MEDICINE | Age: 29
End: 2024-05-10

## 2024-05-10 VITALS
OXYGEN SATURATION: 98 % | WEIGHT: 231 LBS | HEART RATE: 88 BPM | DIASTOLIC BLOOD PRESSURE: 82 MMHG | HEIGHT: 72 IN | SYSTOLIC BLOOD PRESSURE: 146 MMHG | BODY MASS INDEX: 31.29 KG/M2 | TEMPERATURE: 98.8 F

## 2024-05-10 DIAGNOSIS — I10 PRIMARY HYPERTENSION: Primary | ICD-10-CM

## 2024-05-10 DIAGNOSIS — F33.42 RECURRENT MAJOR DEPRESSIVE DISORDER, IN FULL REMISSION (HCC): ICD-10-CM

## 2024-05-10 DIAGNOSIS — F41.9 ANXIETY: ICD-10-CM

## 2024-05-10 PROCEDURE — 3077F SYST BP >= 140 MM HG: CPT

## 2024-05-10 PROCEDURE — G8417 CALC BMI ABV UP PARAM F/U: HCPCS

## 2024-05-10 PROCEDURE — 3079F DIAST BP 80-89 MM HG: CPT

## 2024-05-10 PROCEDURE — 4004F PT TOBACCO SCREEN RCVD TLK: CPT

## 2024-05-10 PROCEDURE — 99213 OFFICE O/P EST LOW 20 MIN: CPT

## 2024-05-10 PROCEDURE — G8427 DOCREV CUR MEDS BY ELIG CLIN: HCPCS

## 2024-05-10 RX ORDER — AMLODIPINE BESYLATE 5 MG/1
5 TABLET ORAL DAILY
Qty: 30 TABLET | Refills: 3 | Status: SHIPPED | OUTPATIENT
Start: 2024-05-10

## 2024-05-10 RX ORDER — HYDROCHLOROTHIAZIDE 25 MG/1
25 TABLET ORAL EVERY MORNING
Qty: 90 TABLET | Refills: 1 | Status: CANCELLED | OUTPATIENT
Start: 2024-05-10

## 2024-05-10 ASSESSMENT — PATIENT HEALTH QUESTIONNAIRE - PHQ9
5. POOR APPETITE OR OVEREATING: NOT AT ALL
SUM OF ALL RESPONSES TO PHQ QUESTIONS 1-9: 1
1. LITTLE INTEREST OR PLEASURE IN DOING THINGS: NOT AT ALL
SUM OF ALL RESPONSES TO PHQ QUESTIONS 1-9: 1
10. IF YOU CHECKED OFF ANY PROBLEMS, HOW DIFFICULT HAVE THESE PROBLEMS MADE IT FOR YOU TO DO YOUR WORK, TAKE CARE OF THINGS AT HOME, OR GET ALONG WITH OTHER PEOPLE: SOMEWHAT DIFFICULT
8. MOVING OR SPEAKING SO SLOWLY THAT OTHER PEOPLE COULD HAVE NOTICED. OR THE OPPOSITE, BEING SO FIGETY OR RESTLESS THAT YOU HAVE BEEN MOVING AROUND A LOT MORE THAN USUAL: NOT AT ALL
6. FEELING BAD ABOUT YOURSELF - OR THAT YOU ARE A FAILURE OR HAVE LET YOURSELF OR YOUR FAMILY DOWN: NOT AT ALL
SUM OF ALL RESPONSES TO PHQ QUESTIONS 1-9: 1
9. THOUGHTS THAT YOU WOULD BE BETTER OFF DEAD, OR OF HURTING YOURSELF: NOT AT ALL
3. TROUBLE FALLING OR STAYING ASLEEP: NOT AT ALL
SUM OF ALL RESPONSES TO PHQ QUESTIONS 1-9: 1
4. FEELING TIRED OR HAVING LITTLE ENERGY: SEVERAL DAYS
2. FEELING DOWN, DEPRESSED OR HOPELESS: NOT AT ALL
7. TROUBLE CONCENTRATING ON THINGS, SUCH AS READING THE NEWSPAPER OR WATCHING TELEVISION: NOT AT ALL
SUM OF ALL RESPONSES TO PHQ9 QUESTIONS 1 & 2: 0

## 2024-05-10 ASSESSMENT — ENCOUNTER SYMPTOMS
SHORTNESS OF BREATH: 0
ABDOMINAL PAIN: 0
CHEST TIGHTNESS: 0
COLOR CHANGE: 0
ABDOMINAL DISTENTION: 0

## 2024-05-10 NOTE — PROGRESS NOTES
Attending Physician Statement  I have discussed the care of Koby Aleman, including pertinent history and exam findings with the resident. I have reviewed the key elements of all parts of the encounter with the resident. I have seen and examined the patient with the resident and the key elements of all parts of the encounter have been performed by me.  I agree with the assessment, and status of the problem list as documented. The plan and orders should include No orders of the defined types were placed in this encounter.   and this was also documented by the resident. The medication list was reviewed with the resident and is up to date. The return visit should be in 4 weeks .    Ney Farrell MD  Attending Physician,  Department of Internal Medicine  [facility] Internal Medicine  [facility]      5/10/2024, 9:35 AM

## 2024-05-10 NOTE — PROGRESS NOTES
[facility]  [facility]  [address] [place] [address]  Dept: [phone]  Dept Fax: [phone]    Office Progress/Follow Up Note  Date ofpatient's visit: 5/10/2024  Patient's Name:  Koby Aleman YOB: 1995            Patient Care Team:  Adrian Flores MD as PCP - General (Internal Medicine)  ================================================================    REASON FOR VISIT/CHIEF COMPLAINT:  FOLLOW-UP VISIT; Hypertension (Pt did not take medication today, pt states he is out of medication) and Hand Injury (Pt need suture remove on left pointing finger)    HISTORY OF PRESENTING ILLNESS:  History was obtained from: patient, electronic medical record. Koby Rodriguez a 29 y.o. is here for a follow-up regarding hypertension, depression and anxiety and suture removal.      Hypertension: Patient's blood pressure today 146/82, patient was previously prescribed hydrochlorothiazide 25 mg daily however patient had hypokalemia while on the medication.  Patient has also ran out of medication.  We will restart the patient on a new medication 5 mg of Norvasc.  Patient was agreeable to the plan.  Patient states that his blood pressures also been running high at home and he has been intermittently taking his mother's medication.  Patient was advised not to take any other medication that is not prescribed for him.  Patient was understandable.    Depression and anxiety.  Patient is currently well-controlled with venlafaxine and mirtazapine.  Patient's PHQ-9 score today is 1    Suture removal: Patient cut his left index finger while cutting up a watermelon.  Patient went to an urgent care and had 4 sutures placed in his digit.  Patient states that sutures have been in for 15 days now and was supposed to have them removed however stated he would just follow-up with me today and have them removed.        Smoking: Current smoker, encouraged cessation  Alcohol: None  Illicit Drug

## 2024-05-10 NOTE — TELEPHONE ENCOUNTER
Called pt to let him know about his [employer] information, pt [employer] username [id number]

## 2024-06-14 ENCOUNTER — OFFICE VISIT (OUTPATIENT)
Dept: INTERNAL MEDICINE | Age: 29
End: 2024-06-14
Payer: COMMERCIAL

## 2024-06-14 VITALS
SYSTOLIC BLOOD PRESSURE: 138 MMHG | WEIGHT: 259 LBS | HEIGHT: 72 IN | BODY MASS INDEX: 35.08 KG/M2 | HEART RATE: 85 BPM | TEMPERATURE: 97.5 F | OXYGEN SATURATION: 98 % | DIASTOLIC BLOOD PRESSURE: 90 MMHG

## 2024-06-14 DIAGNOSIS — F33.42 RECURRENT MAJOR DEPRESSIVE DISORDER, IN FULL REMISSION (HCC): ICD-10-CM

## 2024-06-14 DIAGNOSIS — I10 PRIMARY HYPERTENSION: Primary | ICD-10-CM

## 2024-06-14 PROCEDURE — 90677 PCV20 VACCINE IM: CPT | Performed by: HOSPITALIST

## 2024-06-14 PROCEDURE — 90746 HEPB VACCINE 3 DOSE ADULT IM: CPT | Performed by: HOSPITALIST

## 2024-06-14 ASSESSMENT — PATIENT HEALTH QUESTIONNAIRE - PHQ9
4. FEELING TIRED OR HAVING LITTLE ENERGY: NEARLY EVERY DAY
SUM OF ALL RESPONSES TO PHQ QUESTIONS 1-9: 11
9. THOUGHTS THAT YOU WOULD BE BETTER OFF DEAD, OR OF HURTING YOURSELF: NOT AT ALL
7. TROUBLE CONCENTRATING ON THINGS, SUCH AS READING THE NEWSPAPER OR WATCHING TELEVISION: NEARLY EVERY DAY
1. LITTLE INTEREST OR PLEASURE IN DOING THINGS: NOT AT ALL
8. MOVING OR SPEAKING SO SLOWLY THAT OTHER PEOPLE COULD HAVE NOTICED. OR THE OPPOSITE, BEING SO FIGETY OR RESTLESS THAT YOU HAVE BEEN MOVING AROUND A LOT MORE THAN USUAL: NEARLY EVERY DAY
5. POOR APPETITE OR OVEREATING: NOT AT ALL
SUM OF ALL RESPONSES TO PHQ9 QUESTIONS 1 & 2: 2
SUM OF ALL RESPONSES TO PHQ QUESTIONS 1-9: 11
3. TROUBLE FALLING OR STAYING ASLEEP: NOT AT ALL
SUM OF ALL RESPONSES TO PHQ QUESTIONS 1-9: 11
2. FEELING DOWN, DEPRESSED OR HOPELESS: MORE THAN HALF THE DAYS
10. IF YOU CHECKED OFF ANY PROBLEMS, HOW DIFFICULT HAVE THESE PROBLEMS MADE IT FOR YOU TO DO YOUR WORK, TAKE CARE OF THINGS AT HOME, OR GET ALONG WITH OTHER PEOPLE: NOT DIFFICULT AT ALL
SUM OF ALL RESPONSES TO PHQ QUESTIONS 1-9: 11
6. FEELING BAD ABOUT YOURSELF - OR THAT YOU ARE A FAILURE OR HAVE LET YOURSELF OR YOUR FAMILY DOWN: NOT AT ALL

## 2024-06-14 ASSESSMENT — ENCOUNTER SYMPTOMS
SHORTNESS OF BREATH: 0
ABDOMINAL DISTENTION: 0
COLOR CHANGE: 0

## 2024-06-14 NOTE — PROGRESS NOTES
Attending Physician Statement  I have discussed the care of Koby Aleman, including pertinent history and exam findings with the resident. I have reviewed the key elements of all parts of the encounter with the resident. I have seen and examined the patient with the resident and the key elements of all parts of the encounter have been performed by me.  I agree with the assessment, and status of the problem list as documented. The plan and orders should include   Orders Placed This Encounter   Procedures    Pneumococcal, PCV20, PREVNAR 20, (age 6w+), IM, PF    Hep B, ENGERIX-B, (age 20 yrs+), IM, 1mL, 3-dose    and this was also documented by the resident. The medication list was reviewed with the resident and is up to date. The return visit should be in 6 months .    Ney Farrell MD  Attending Physician,  Department of Internal Medicine  [facility] Internal Medicine  [facility]      6/14/2024, 9:56 AM    
following healthy behaviors: nutrition, exercise, medication adherence, and tobacco cessation    Discussed use, benefit, and side effects of prescribed medications.  Barriers to medication compliance addressed.  All patient questions answered.  Pt voiced understanding.    Patient given educational materials - see patient instructions  Adrian Flores MD   Internal Medicine  6/14/2024, 12:03 PM    This note is created with the assistance of a speech-recognition program. While intending to generate a document that actually reflects the content of thevisit, the document can still have some mistakes which may not have been identified and corrected by editing.

## 2024-06-19 ENCOUNTER — APPOINTMENT (OUTPATIENT)
Dept: GENERAL RADIOLOGY | Age: 29
End: 2024-06-19
Payer: COMMERCIAL

## 2024-06-19 ENCOUNTER — HOSPITAL ENCOUNTER (EMERGENCY)
Age: 29
Discharge: HOME | End: 2024-06-19
Attending: EMERGENCY MEDICINE
Payer: COMMERCIAL

## 2024-06-19 VITALS
SYSTOLIC BLOOD PRESSURE: 134 MMHG | DIASTOLIC BLOOD PRESSURE: 83 MMHG | OXYGEN SATURATION: 98 % | HEART RATE: 66 BPM | TEMPERATURE: 98.7 F | RESPIRATION RATE: 18 BRPM

## 2024-06-19 DIAGNOSIS — R06.02 SHORTNESS OF BREATH: ICD-10-CM

## 2024-06-19 DIAGNOSIS — R07.9 CHEST PAIN, UNSPECIFIED TYPE: Primary | ICD-10-CM

## 2024-06-19 LAB
ANION GAP SERPL CALCULATED.3IONS-SCNC: 13 MMOL/L (ref 9–16)
BASOPHILS # BLD: 0.07 K/UL (ref 0–0.2)
BASOPHILS NFR BLD: 1 % (ref 0–2)
BUN SERPL-MCNC: 10 MG/DL (ref 6–20)
CALCIUM SERPL-MCNC: 8.9 MG/DL (ref 8.6–10.4)
CHLORIDE SERPL-SCNC: 106 MMOL/L (ref 98–107)
CO2 SERPL-SCNC: 23 MMOL/L (ref 20–31)
CREAT SERPL-MCNC: 0.9 MG/DL (ref 0.7–1.2)
D DIMER PPP FEU-MCNC: 0.31 UG/ML FEU (ref 0–0.57)
EOSINOPHIL # BLD: 0.28 K/UL (ref 0–0.44)
EOSINOPHILS RELATIVE PERCENT: 3 % (ref 1–4)
ERYTHROCYTE [DISTWIDTH] IN BLOOD BY AUTOMATED COUNT: 12 % (ref 11.8–14.4)
GFR, ESTIMATED: >90 ML/MIN/1.73M2
GLUCOSE SERPL-MCNC: 94 MG/DL (ref 74–99)
HCT VFR BLD AUTO: 43.7 % (ref 40.7–50.3)
HGB BLD-MCNC: 14.6 G/DL (ref 13–17)
IMM GRANULOCYTES # BLD AUTO: 0.03 K/UL (ref 0–0.3)
IMM GRANULOCYTES NFR BLD: 0 %
LYMPHOCYTES NFR BLD: 3.98 K/UL (ref 1.1–3.7)
LYMPHOCYTES RELATIVE PERCENT: 41 % (ref 24–43)
MCH RBC QN AUTO: 30.2 PG (ref 25.2–33.5)
MCHC RBC AUTO-ENTMCNC: 33.4 G/DL (ref 28.4–34.8)
MCV RBC AUTO: 90.3 FL (ref 82.6–102.9)
MONOCYTES NFR BLD: 0.9 K/UL (ref 0.1–1.2)
MONOCYTES NFR BLD: 9 % (ref 3–12)
NEUTROPHILS NFR BLD: 46 % (ref 36–65)
NEUTS SEG NFR BLD: 4.41 K/UL (ref 1.5–8.1)
NRBC BLD-RTO: 0 PER 100 WBC
PLATELET # BLD AUTO: 256 K/UL (ref 138–453)
PMV BLD AUTO: 10.1 FL (ref 8.1–13.5)
POTASSIUM SERPL-SCNC: 3.6 MMOL/L (ref 3.7–5.3)
RBC # BLD AUTO: 4.84 M/UL (ref 4.21–5.77)
SODIUM SERPL-SCNC: 142 MMOL/L (ref 136–145)
TROPONIN I SERPL HS-MCNC: 10 NG/L (ref 0–22)
TROPONIN I SERPL HS-MCNC: 10 NG/L (ref 0–22)
WBC OTHER # BLD: 9.7 K/UL (ref 3.5–11.3)

## 2024-06-19 PROCEDURE — 80048 BASIC METABOLIC PNL TOTAL CA: CPT

## 2024-06-19 PROCEDURE — 84484 ASSAY OF TROPONIN QUANT: CPT

## 2024-06-19 PROCEDURE — 99285 EMERGENCY DEPT VISIT HI MDM: CPT

## 2024-06-19 PROCEDURE — 85379 FIBRIN DEGRADATION QUANT: CPT

## 2024-06-19 PROCEDURE — 85025 COMPLETE CBC W/AUTO DIFF WBC: CPT

## 2024-06-19 PROCEDURE — 71046 X-RAY EXAM CHEST 2 VIEWS: CPT

## 2024-06-19 PROCEDURE — 93005 ELECTROCARDIOGRAM TRACING: CPT

## 2024-06-19 ASSESSMENT — PAIN DESCRIPTION - LOCATION: LOCATION: CHEST

## 2024-06-19 ASSESSMENT — PAIN SCALES - GENERAL: PAINLEVEL_OUTOF10: 2

## 2024-06-19 ASSESSMENT — ENCOUNTER SYMPTOMS
ALLERGIC/IMMUNOLOGIC NEGATIVE: 1
EYES NEGATIVE: 1
GASTROINTESTINAL NEGATIVE: 1
SHORTNESS OF BREATH: 1

## 2024-06-19 ASSESSMENT — HEART SCORE
ECG: NORMAL
ECG: NON-SPECIFC REPOLARIZATION DISTURBANCE/LBTB/PM

## 2024-06-19 NOTE — ED PROVIDER NOTES
[facility] ED  Emergency Department Encounter  Emergency Medicine Resident     Pt Name:Koby Aleman  MRN: [id number]  Birthdate 1995  Date of evaluation: 6/19/24  PCP:  Adrian Flores MD  Note Started: 4:45 AM EDT      CHIEF COMPLAINT       Chief Complaint   Patient presents with    Chest Pain    Shortness of Breath       HISTORY OF PRESENT ILLNESS  (Location/Symptom, Timing/Onset, Context/Setting, Quality, Duration, Modifying Factors, Severity.)      Koby Aleman is a 29 y.o. male with PMH of appendicitis, anxiety, MDD, with who presents with chest pain and shortness of breath.  Patient explains he had chest pain 5 hours ago.  Pain described as heavy 6/10 and intermittent lasting about 30 minutes.  Associated symptoms included shortness of breath.  Patient concerned given his shortness of breath but does express having previous chest pains previously.    PAST MEDICAL / SURGICAL / SOCIAL / FAMILY HISTORY      has a past medical history of Appendicitis, Elevated blood pressure reading, and Severe episode of recurrent major depressive disorder, without psychotic features (HCC).       has a past surgical history that includes Appendectomy (5/24/2016).      Social History     Socioeconomic History    Marital status: Single     Spouse name: Not on file    Number of children: Not on file    Years of education: Not on file    Highest education level: Not on file   Occupational History    Not on file   Tobacco Use    Smoking status: Every Day     Current packs/day: 0.25     Average packs/day: 0.3 packs/day for 1 year (0.3 ttl pk-yrs)     Types: Cigarettes    Smokeless tobacco: Never    Tobacco comments:     Smoking 6 cigarettes a day   Vaping Use    Vaping Use: Some days    Substances: flavoring   Substance and Sexual Activity    Alcohol use: No    Drug use: No    Sexual activity: Yes   Other Topics Concern    Not on file   Social History Narrative    Not on file     Social Determinants of Health

## 2024-06-19 NOTE — ED PROVIDER NOTES
Care of Koby Aleman was assumed from previous attending and is being seen for Chest Pain and Shortness of Breath  .  The patient's initial evaluation and plan have been discussed with the prior provider who initially evaluated the patient.    Handoff taken on the following patient from prior Attending Physician:rudy 8:04 AM EDT      Attestation    I was available and discussed any additional care issues that arose and coordinated the management plans with the resident(s) caring for the patient during my duty period. Any areas of disagreement with resident’s documentation of care or procedures are noted on the chart. I was personally present for the key portions of any/all procedures during my duty period. I have documented in the chart those procedures where I was not present during the key portions.      EMERGENCY DEPARTMENT COURSE / MEDICAL DECISION MAKING:       MEDICATIONS GIVEN:  No orders of the defined types were placed in this encounter.      LABS / RADIOLOGY:     Labs Reviewed   BASIC METABOLIC PANEL - Abnormal; Notable for the following components:       Result Value    Potassium 3.6 (*)     All other components within normal limits   CBC WITH AUTO DIFFERENTIAL - Abnormal; Notable for the following components:    Lymphocytes Absolute 3.98 (*)     All other components within normal limits   TROPONIN   TROPONIN   D-DIMER, QUANTITATIVE       XR CHEST (2 VW)    Result Date: 6/19/2024  EXAMINATION: TWO XRAY VIEWS OF THE CHEST 6/19/2024 5:57 am COMPARISON: Chest radiograph 2016. HISTORY: ORDERING SYSTEM PROVIDED HISTORY: BESSY GUNN TECHNOLOGIST PROVIDED HISTORY: BESSY GUNN FINDINGS: Two views provided. Normal mediastinal and hilar silhouettes.  Normal lung volumes.  No acute consolidation or pleural effusion.  No pulmonary mass.  No pneumothorax or free subdiaphragmatic air.  The visualized bowel gas pattern is normal. Normal bone mineral density.  Normal thoracic spine curvature, alignment, and heights.     No

## 2024-06-19 NOTE — DISCHARGE INSTRUCTIONS
You were seen and evaluated for chest pain and shortness of breath.  Our labs, imaging and heart workup were negative for heart abnormalities.  You are strongly encouraged to follow-up with your primary care provider in order to discuss your symptoms, this ER visit, your heart workup findings.  You are also strongly encouraged to consider cessation of smoking as this increases your likelihood of cardiac disease.  Please return to the ER for persistent chest pain, persistent shortness of breath, nausea vomiting, or any other questions or concerns.

## 2024-06-19 NOTE — ED NOTES
Arrived patient to ED presents with chest pain and shortness of breath. Patient states that chest pain started an hour ago when he was lying down on bed, painscale of 2/10. Patient has no respiratory distress on arrival. Hooked to cardiac monitor at 81 bpm, bloods collected and send to lab. Patient denies any suicidal/homicidal thoughts.  Bed on lowest position. Call light provided.

## 2024-06-20 LAB
EKG ATRIAL RATE: 65 BPM
EKG P AXIS: 49 DEGREES
EKG P-R INTERVAL: 168 MS
EKG Q-T INTERVAL: 436 MS
EKG QRS DURATION: 86 MS
EKG QTC CALCULATION (BAZETT): 453 MS
EKG R AXIS: 28 DEGREES
EKG T AXIS: 34 DEGREES
EKG VENTRICULAR RATE: 65 BPM

## 2024-06-20 NOTE — ED PROVIDER NOTES
[facility]   Emergency Department  Faculty Attestation       I performed a history and physical examination of the patient and discussed management with the resident. I reviewed the resident’s note and agree with the documented findings including all diagnostic interpretations and plan of care. Any areas of disagreement are noted on the chart. I was personally present for the key portions of any procedures. I have documented in the chart those procedures where I was not present during the key portions. I have reviewed the emergency nurses triage note. I agree with the chief complaint, past medical history, past surgical history, allergies, medications, social and family history as documented unless otherwise noted below.  For Physician Assistant/ Nurse Practitioner cases/documentation I have personally evaluated this patient and have completed at least one if not all key elements of the E/M (history, physical exam, and MDM). Additional findings are as noted.    Patient Name: Koby Aleman  MRN: [id number]  : 1995  Primary Care Physician: Adrian Flores MD    Date of evaluationa: 2024   Note Started: 5:30 PM EDT    Pertinent Comments     Chief Complaint:   Chief Complaint   Patient presents with    Chest Pain    Shortness of Breath        Initial vitals: (If not listed, please see nursing documentation)  ED Triage Vitals [24 0415]   BP Temp Temp Source Pulse Respirations SpO2 Height Weight   (!) 144/102 98.7 °F (37.1 °C) Oral 89 18 98 % -- --        HPI/PE/Impression:  This is a 29 y.o. male who presents to the Emergency Department chest pain shortness of breath.  It was intermittent, heaviness the last approximate 30 minutes and had associated shortness of breath.  Has now resolved.  Had the chest pain previously but not had the shortness of breath previously.  No significant history of cardiac events, but does have a history of hypertension.  Exam he is awake alert answering  questions in no acute distress.  Heart sounds regular lungs auscultation.  Abdomen soft nontender nondistended.  Alert oriented x 4 no focal deficits    Medical Decision Making  Chest pain associated shortness of breath, now resolved.  Does have a history of hypertension  Low suspicion of ACS, however will get cardiac workup.  Will also get D-dimer given the shortness of breath      Heart score of 1 if troponins normal.    If workup negative likely plan for discharge.  Patient care signed out to oncoming physician at 7 AM    Problems Addressed:  Chest pain, unspecified type: acute illness or injury  Shortness of breath: acute illness or injury    Amount and/or Complexity of Data Reviewed  Labs: ordered. Decision-making details documented in ED Course.  Radiology: ordered.  ECG/medicine tests: ordered.       EKG Interpretation    Interpreted by emergency department physician    Clinical Impression: Sinus rhythm with sinus arrhythmia.  No signs of acute ST changes.    Vickie Byers MD    Critical Care: None     Vickie Byers MD  Attending Emergency Physician         Vickie Byers MD  06/20/24 2944

## 2024-07-16 ENCOUNTER — NURSE ONLY (OUTPATIENT)
Dept: INTERNAL MEDICINE | Age: 29
End: 2024-07-16
Payer: COMMERCIAL

## 2024-07-16 DIAGNOSIS — Z23 NEED FOR HEPATITIS B VACCINATION: Primary | ICD-10-CM

## 2024-07-16 PROCEDURE — 90746 HEPB VACCINE 3 DOSE ADULT IM: CPT

## 2024-07-16 NOTE — PROGRESS NOTES
Pt is here for Hep B vaccination.     Pt presents with no health complaints and is afebrile.    Hep B vaccine given in left deltoid.     This is shot number 2 of 3.    Patient tolerated vaccination well, VIS reviewed and given to patient.

## 2024-09-17 DIAGNOSIS — I10 PRIMARY HYPERTENSION: ICD-10-CM

## 2024-09-20 RX ORDER — AMLODIPINE BESYLATE 5 MG/1
5 TABLET ORAL DAILY
Qty: 30 TABLET | Refills: 3 | Status: SHIPPED | OUTPATIENT
Start: 2024-09-20

## 2025-01-15 ENCOUNTER — OFFICE VISIT (OUTPATIENT)
Dept: INTERNAL MEDICINE | Age: 30
End: 2025-01-15
Payer: COMMERCIAL

## 2025-01-15 VITALS
DIASTOLIC BLOOD PRESSURE: 98 MMHG | HEART RATE: 98 BPM | OXYGEN SATURATION: 99 % | WEIGHT: 278 LBS | SYSTOLIC BLOOD PRESSURE: 148 MMHG | BODY MASS INDEX: 41.18 KG/M2 | TEMPERATURE: 98.4 F | HEIGHT: 69 IN

## 2025-01-15 DIAGNOSIS — F41.9 ANXIETY: ICD-10-CM

## 2025-01-15 DIAGNOSIS — I10 PRIMARY HYPERTENSION: Primary | ICD-10-CM

## 2025-01-15 DIAGNOSIS — F17.200 SMOKING ADDICTION: ICD-10-CM

## 2025-01-15 DIAGNOSIS — F33.42 RECURRENT MAJOR DEPRESSIVE DISORDER, IN FULL REMISSION (HCC): ICD-10-CM

## 2025-01-15 PROCEDURE — G8427 DOCREV CUR MEDS BY ELIG CLIN: HCPCS

## 2025-01-15 PROCEDURE — 3080F DIAST BP >= 90 MM HG: CPT

## 2025-01-15 PROCEDURE — 99213 OFFICE O/P EST LOW 20 MIN: CPT

## 2025-01-15 PROCEDURE — 99211 OFF/OP EST MAY X REQ PHY/QHP: CPT | Performed by: INTERNAL MEDICINE

## 2025-01-15 PROCEDURE — 4004F PT TOBACCO SCREEN RCVD TLK: CPT

## 2025-01-15 PROCEDURE — G8417 CALC BMI ABV UP PARAM F/U: HCPCS

## 2025-01-15 PROCEDURE — 3077F SYST BP >= 140 MM HG: CPT

## 2025-01-15 RX ORDER — NICOTINE 21 MG/24HR
1 PATCH, TRANSDERMAL 24 HOURS TRANSDERMAL DAILY
Qty: 42 PATCH | Refills: 1 | Status: SHIPPED | OUTPATIENT
Start: 2025-01-15 | End: 2025-04-09

## 2025-01-15 RX ORDER — AMLODIPINE BESYLATE 5 MG/1
10 TABLET ORAL DAILY
Qty: 30 TABLET | Refills: 3 | Status: SHIPPED | OUTPATIENT
Start: 2025-01-15 | End: 2025-01-24 | Stop reason: SDUPTHER

## 2025-01-15 SDOH — ECONOMIC STABILITY: FOOD INSECURITY: WITHIN THE PAST 12 MONTHS, THE FOOD YOU BOUGHT JUST DIDN'T LAST AND YOU DIDN'T HAVE MONEY TO GET MORE.: NEVER TRUE

## 2025-01-15 SDOH — ECONOMIC STABILITY: FOOD INSECURITY: WITHIN THE PAST 12 MONTHS, YOU WORRIED THAT YOUR FOOD WOULD RUN OUT BEFORE YOU GOT MONEY TO BUY MORE.: NEVER TRUE

## 2025-01-15 ASSESSMENT — PATIENT HEALTH QUESTIONNAIRE - PHQ9
SUM OF ALL RESPONSES TO PHQ QUESTIONS 1-9: 0
10. IF YOU CHECKED OFF ANY PROBLEMS, HOW DIFFICULT HAVE THESE PROBLEMS MADE IT FOR YOU TO DO YOUR WORK, TAKE CARE OF THINGS AT HOME, OR GET ALONG WITH OTHER PEOPLE: NOT DIFFICULT AT ALL
SUM OF ALL RESPONSES TO PHQ QUESTIONS 1-9: 0
5. POOR APPETITE OR OVEREATING: NOT AT ALL
6. FEELING BAD ABOUT YOURSELF - OR THAT YOU ARE A FAILURE OR HAVE LET YOURSELF OR YOUR FAMILY DOWN: NOT AT ALL
9. THOUGHTS THAT YOU WOULD BE BETTER OFF DEAD, OR OF HURTING YOURSELF: NOT AT ALL
8. MOVING OR SPEAKING SO SLOWLY THAT OTHER PEOPLE COULD HAVE NOTICED. OR THE OPPOSITE, BEING SO FIGETY OR RESTLESS THAT YOU HAVE BEEN MOVING AROUND A LOT MORE THAN USUAL: NOT AT ALL
4. FEELING TIRED OR HAVING LITTLE ENERGY: NOT AT ALL
7. TROUBLE CONCENTRATING ON THINGS, SUCH AS READING THE NEWSPAPER OR WATCHING TELEVISION: NOT AT ALL
SUM OF ALL RESPONSES TO PHQ9 QUESTIONS 1 & 2: 0
SUM OF ALL RESPONSES TO PHQ QUESTIONS 1-9: 0
1. LITTLE INTEREST OR PLEASURE IN DOING THINGS: NOT AT ALL
SUM OF ALL RESPONSES TO PHQ QUESTIONS 1-9: 0
2. FEELING DOWN, DEPRESSED OR HOPELESS: NOT AT ALL
3. TROUBLE FALLING OR STAYING ASLEEP: NOT AT ALL

## 2025-01-15 NOTE — PROGRESS NOTES
Attending Physician Statement  I have discussed the care of Koby Aleman, including pertinent history and exam findings with the resident. I have reviewed the key elements of all parts of the encounter with the resident. I agree with the assessment, and status of the problem list as documented.   Diagnosis Orders   1. Primary hypertension        2. Smoking addiction  nicotine (NICODERM CQ) 14 MG/24HR    nicotine (NICODERM CQ) 7 MG/24HR      3. Anxiety        4. Recurrent major depressive disorder, in full remission (HCC)        Increase norvasc 10mg  DASH diet     The plan and orders should include No orders of the defined types were placed in this encounter.   and this was also documented by the resident.The medication list was reviewed with the resident and is up to date. The return visit should be in 1 month .    Dr Manuelito Shepherd MD, FACP  Associate , Internal Medicine Residency Program  Residency Clinic , [facility] IM  Chair, Department of Internal Medicine  [facility] Internal Medicine Clerkship         1/15/2025, 9:16 AM        
maintenance:  Health maintenance:  Colon cancer: Starts at 45. If family Hx of CRC then needs to start at 40 or 10 years before the age family got it.  Lung cancer screenin-80 with 20ppy hx who quit <15 years ago. Annual low dose ct until 15+ years from quitting   AAA: Men age 65 who have ever smoked. Once  Depression screening: PHQ-9: Negative today  HCV: Nonreactive in   HIV: Nonreactive in   DM: 35-70 and overweight. Q3 yrs if <5.7    Patient has completed vaccination series for hepatitis B as well as pneumonia.  Lab Results   Component Value Date    HGB 14.6 2024    CHOL 151 10/28/2022    HDL 33 (L) 10/28/2022    TRIG 65 10/28/2022    CREATININE 0.9 2024         Patient Active Problem List   Diagnosis    Keloid    Anxiety    Primary hypertension    Recurrent major depressive disorder, in full remission (HCC)    Class 2 obesity due to excess calories without serious comorbidity with body mass index (BMI) of 36.0 to 36.9 in adult       Health Maintenance Due   Topic Date Due    Flu vaccine (1) 2024    COVID-19 Vaccine ( - - season) Never done       No Known Allergies      Current Outpatient Medications   Medication Sig Dispense Refill    nicotine (NICODERM CQ) 14 MG/24HR Place 1 patch onto the skin daily 14 mg patches for 6 weeks then 7 mg patches for 2 weeks 42 patch 1    [START ON 4/10/2025] nicotine (NICODERM CQ) 7 MG/24HR Place 1 patch onto the skin daily for 14 days Start after completing 14 mg patches. 14 patch 0    amLODIPine (NORVASC) 5 MG tablet Take 2 tablets by mouth daily 30 tablet 3    mirtazapine (REMERON) 15 MG tablet Take 1 tablet by mouth nightly      venlafaxine (EFFEXOR XR) 75 MG extended release capsule Take 1 capsule by mouth daily      melatonin 1 MG tablet Take 1 tablet by mouth nightly as needed for Sleep 30 tablet 0     No current facility-administered medications for this visit.       Social History     Tobacco Use    Smoking status: Every Day

## 2025-01-24 DIAGNOSIS — I10 PRIMARY HYPERTENSION: ICD-10-CM

## 2025-01-24 RX ORDER — AMLODIPINE BESYLATE 10 MG/1
10 TABLET ORAL DAILY
Qty: 30 TABLET | Refills: 2 | Status: SHIPPED | OUTPATIENT
Start: 2025-01-24

## 2025-01-24 NOTE — TELEPHONE ENCOUNTER
Pt needs new script for amlodipine 10 mg sent in instead of take 2 tablets of 5 mg otherwise PA will populate. Thank you!

## 2025-02-12 ENCOUNTER — OFFICE VISIT (OUTPATIENT)
Dept: INTERNAL MEDICINE | Age: 30
End: 2025-02-12
Payer: COMMERCIAL

## 2025-02-12 VITALS
SYSTOLIC BLOOD PRESSURE: 145 MMHG | TEMPERATURE: 97.6 F | HEART RATE: 97 BPM | OXYGEN SATURATION: 98 % | BODY MASS INDEX: 42.27 KG/M2 | HEIGHT: 69 IN | DIASTOLIC BLOOD PRESSURE: 86 MMHG | WEIGHT: 285.4 LBS

## 2025-02-12 DIAGNOSIS — I10 PRIMARY HYPERTENSION: Primary | ICD-10-CM

## 2025-02-12 DIAGNOSIS — F17.200 SMOKING ADDICTION: ICD-10-CM

## 2025-02-12 DIAGNOSIS — F33.42 RECURRENT MAJOR DEPRESSIVE DISORDER, IN FULL REMISSION: ICD-10-CM

## 2025-02-12 DIAGNOSIS — F41.9 ANXIETY: ICD-10-CM

## 2025-02-12 DIAGNOSIS — G47.9 SLEEP DISTURBANCE: ICD-10-CM

## 2025-02-12 PROCEDURE — 1036F TOBACCO NON-USER: CPT

## 2025-02-12 PROCEDURE — 3077F SYST BP >= 140 MM HG: CPT

## 2025-02-12 PROCEDURE — G8417 CALC BMI ABV UP PARAM F/U: HCPCS

## 2025-02-12 PROCEDURE — 99212 OFFICE O/P EST SF 10 MIN: CPT

## 2025-02-12 PROCEDURE — G8427 DOCREV CUR MEDS BY ELIG CLIN: HCPCS

## 2025-02-12 PROCEDURE — 3079F DIAST BP 80-89 MM HG: CPT

## 2025-02-12 PROCEDURE — 99213 OFFICE O/P EST LOW 20 MIN: CPT

## 2025-02-12 RX ORDER — MIRTAZAPINE 15 MG/1
15 TABLET, FILM COATED ORAL NIGHTLY
Qty: 30 TABLET | Refills: 0 | Status: SHIPPED | OUTPATIENT
Start: 2025-02-12

## 2025-02-12 RX ORDER — NICOTINE 21 MG/24HR
1 PATCH, TRANSDERMAL 24 HOURS TRANSDERMAL DAILY
Qty: 42 PATCH | Refills: 1 | Status: SHIPPED | OUTPATIENT
Start: 2025-02-12 | End: 2025-05-07

## 2025-02-12 RX ORDER — VENLAFAXINE HYDROCHLORIDE 75 MG/1
75 CAPSULE, EXTENDED RELEASE ORAL DAILY
Qty: 30 CAPSULE | Refills: 0 | Status: SHIPPED | OUTPATIENT
Start: 2025-02-12

## 2025-02-12 ASSESSMENT — PATIENT HEALTH QUESTIONNAIRE - PHQ9
3. TROUBLE FALLING OR STAYING ASLEEP: NEARLY EVERY DAY
SUM OF ALL RESPONSES TO PHQ QUESTIONS 1-9: 17
9. THOUGHTS THAT YOU WOULD BE BETTER OFF DEAD, OR OF HURTING YOURSELF: SEVERAL DAYS
8. MOVING OR SPEAKING SO SLOWLY THAT OTHER PEOPLE COULD HAVE NOTICED. OR THE OPPOSITE, BEING SO FIGETY OR RESTLESS THAT YOU HAVE BEEN MOVING AROUND A LOT MORE THAN USUAL: NOT AT ALL
4. FEELING TIRED OR HAVING LITTLE ENERGY: NEARLY EVERY DAY
SUM OF ALL RESPONSES TO PHQ QUESTIONS 1-9: 16
7. TROUBLE CONCENTRATING ON THINGS, SUCH AS READING THE NEWSPAPER OR WATCHING TELEVISION: NEARLY EVERY DAY
5. POOR APPETITE OR OVEREATING: NEARLY EVERY DAY
10. IF YOU CHECKED OFF ANY PROBLEMS, HOW DIFFICULT HAVE THESE PROBLEMS MADE IT FOR YOU TO DO YOUR WORK, TAKE CARE OF THINGS AT HOME, OR GET ALONG WITH OTHER PEOPLE: SOMEWHAT DIFFICULT
2. FEELING DOWN, DEPRESSED OR HOPELESS: MORE THAN HALF THE DAYS
SUM OF ALL RESPONSES TO PHQ QUESTIONS 1-9: 17
SUM OF ALL RESPONSES TO PHQ9 QUESTIONS 1 & 2: 4
SUM OF ALL RESPONSES TO PHQ QUESTIONS 1-9: 17
6. FEELING BAD ABOUT YOURSELF - OR THAT YOU ARE A FAILURE OR HAVE LET YOURSELF OR YOUR FAMILY DOWN: NOT AT ALL
1. LITTLE INTEREST OR PLEASURE IN DOING THINGS: MORE THAN HALF THE DAYS

## 2025-02-12 ASSESSMENT — ANXIETY QUESTIONNAIRES
6. BECOMING EASILY ANNOYED OR IRRITABLE: NEARLY EVERY DAY
7. FEELING AFRAID AS IF SOMETHING AWFUL MIGHT HAPPEN: SEVERAL DAYS
IF YOU CHECKED OFF ANY PROBLEMS ON THIS QUESTIONNAIRE, HOW DIFFICULT HAVE THESE PROBLEMS MADE IT FOR YOU TO DO YOUR WORK, TAKE CARE OF THINGS AT HOME, OR GET ALONG WITH OTHER PEOPLE: SOMEWHAT DIFFICULT
3. WORRYING TOO MUCH ABOUT DIFFERENT THINGS: NEARLY EVERY DAY
1. FEELING NERVOUS, ANXIOUS, OR ON EDGE: MORE THAN HALF THE DAYS
5. BEING SO RESTLESS THAT IT IS HARD TO SIT STILL: SEVERAL DAYS
2. NOT BEING ABLE TO STOP OR CONTROL WORRYING: NEARLY EVERY DAY
GAD7 TOTAL SCORE: 14
4. TROUBLE RELAXING: SEVERAL DAYS

## 2025-02-12 ASSESSMENT — COLUMBIA-SUICIDE SEVERITY RATING SCALE - C-SSRS
2. HAVE YOU ACTUALLY HAD ANY THOUGHTS OF KILLING YOURSELF?: NO
1. WITHIN THE PAST MONTH, HAVE YOU WISHED YOU WERE DEAD OR WISHED YOU COULD GO TO SLEEP AND NOT WAKE UP?: NO
6. HAVE YOU EVER DONE ANYTHING, STARTED TO DO ANYTHING, OR PREPARED TO DO ANYTHING TO END YOUR LIFE?: NO

## 2025-02-12 NOTE — PROGRESS NOTES
Attending Physician Statement  I have discussed the care of Koby Aleman, including pertinent history and exam findings with the resident. I have reviewed the key elements of all parts of the encounter with the resident. I agree with the assessment, and status of the problem list as documented.    Diagnosis Orders   1. Primary hypertension        2. Smoking addiction  nicotine (NICODERM CQ) 14 MG/24HR      3. Anxiety  venlafaxine (EFFEXOR XR) 75 MG extended release capsule    mirtazapine (REMERON) 15 MG tablet      4. Recurrent major depressive disorder, in full remission (HCC)  venlafaxine (EFFEXOR XR) 75 MG extended release capsule    mirtazapine (REMERON) 15 MG tablet      5. Sleep disturbance  melatonin 1 MG tablet        The plan and orders should include No orders of the defined types were placed in this encounter.   and this was also documented by the resident.The medication list was reviewed with the resident and is up to date. The return visit should be in 4 weeks.      Dr Manuelito Shepherd MD, FACP  Associate , Internal Medicine Residency Program  Residency Clinic , [facility] IM  Chair, Department of Internal Medicine  [facility] Internal Medicine Clerkship         2/12/2025, 10:32 AM

## 2025-02-12 NOTE — PROGRESS NOTES
[facility]  [facility]  [address] [place] [place] [address]  Dept: [phone]  Dept Fax: [phone]    Office Progress/Follow Up Note  Date ofpatient's visit: 2/12/2025  Patient's Name:  Koby Aleman YOB: 1995            Patient Care Team:  Adrian Flores MD as PCP - General (Internal Medicine)  ================================================================    REASON FOR VISIT/CHIEF COMPLAINT:  FOLLOW-UP VISIT; Hypertension, Anxiety, Major Depressive Disorder, Health Maintenance (Flu vaccine declined. COVID -19 vaccine declined.), and Medication Refill    HISTORY OF PRESENTING ILLNESS:  History was obtained from: patient, electronic medical record. Koby Aleman is a 29 y.o. is here for a follow-up visit regarding his hypertension.  At his last appointment his Norvasc was increased from 5 mg to 10 mg.  Patient was previously tried on hydrochlorothiazide however felt treatment due to the way it made him feel as well as increased urination.    Hypertension: Today blood pressure is 145/86, heart rate 97.  Patient did not take his 10 mg of Norvasc this a.m.  Patient also states he has not been checking his blood pressure at home.  Patient denies any side effects from increase of medication.  I encouraged the patient to follow a DASH diet as mentioned at her last appointment.    Anxiety and depression: Patient is taking venlafaxine and mirtazapine.  Patient is currently compliant with his medications and follows with outpatient psych.  PHQ-9 today 17, PHYLICIA was 14.  Patient stated he ran out of venlafaxine and mirtazapine however was previously taking the medications appropriately.  Will refill them today and encouraged the patient to follow-up with his psych physician.    Smoking addiction: Patient was recently prescribed nicotine patches for smoking cessation.  Patient states he has not been smoking since starting the patches.      Smoking: None currently   Alcohol:

## 2025-03-12 ENCOUNTER — OFFICE VISIT (OUTPATIENT)
Dept: INTERNAL MEDICINE | Age: 30
End: 2025-03-12
Payer: COMMERCIAL

## 2025-03-12 VITALS
OXYGEN SATURATION: 98 % | WEIGHT: 284 LBS | HEIGHT: 69 IN | BODY MASS INDEX: 42.06 KG/M2 | DIASTOLIC BLOOD PRESSURE: 82 MMHG | HEART RATE: 106 BPM | SYSTOLIC BLOOD PRESSURE: 128 MMHG | TEMPERATURE: 97 F | RESPIRATION RATE: 16 BRPM

## 2025-03-12 DIAGNOSIS — F41.9 ANXIETY: ICD-10-CM

## 2025-03-12 DIAGNOSIS — I10 ESSENTIAL HYPERTENSION: Primary | ICD-10-CM

## 2025-03-12 DIAGNOSIS — I10 PRIMARY HYPERTENSION: ICD-10-CM

## 2025-03-12 DIAGNOSIS — E66.812 CLASS 2 OBESITY DUE TO EXCESS CALORIES WITHOUT SERIOUS COMORBIDITY WITH BODY MASS INDEX (BMI) OF 36.0 TO 36.9 IN ADULT: ICD-10-CM

## 2025-03-12 DIAGNOSIS — E66.09 CLASS 2 OBESITY DUE TO EXCESS CALORIES WITHOUT SERIOUS COMORBIDITY WITH BODY MASS INDEX (BMI) OF 36.0 TO 36.9 IN ADULT: ICD-10-CM

## 2025-03-12 DIAGNOSIS — F33.42 RECURRENT MAJOR DEPRESSIVE DISORDER, IN FULL REMISSION: ICD-10-CM

## 2025-03-12 DIAGNOSIS — F17.200 SMOKING ADDICTION: ICD-10-CM

## 2025-03-12 PROCEDURE — 3079F DIAST BP 80-89 MM HG: CPT

## 2025-03-12 PROCEDURE — 99212 OFFICE O/P EST SF 10 MIN: CPT | Performed by: INTERNAL MEDICINE

## 2025-03-12 PROCEDURE — 3074F SYST BP LT 130 MM HG: CPT

## 2025-03-12 PROCEDURE — 1036F TOBACCO NON-USER: CPT

## 2025-03-12 PROCEDURE — G8417 CALC BMI ABV UP PARAM F/U: HCPCS

## 2025-03-12 PROCEDURE — 99213 OFFICE O/P EST LOW 20 MIN: CPT

## 2025-03-12 PROCEDURE — G8427 DOCREV CUR MEDS BY ELIG CLIN: HCPCS

## 2025-03-12 RX ORDER — VENLAFAXINE HYDROCHLORIDE 75 MG/1
75 CAPSULE, EXTENDED RELEASE ORAL DAILY
Qty: 30 CAPSULE | Refills: 3 | Status: SHIPPED | OUTPATIENT
Start: 2025-03-12

## 2025-03-12 RX ORDER — MIRTAZAPINE 15 MG/1
15 TABLET, FILM COATED ORAL NIGHTLY
Qty: 30 TABLET | Refills: 3 | Status: SHIPPED | OUTPATIENT
Start: 2025-03-12

## 2025-03-12 RX ORDER — AMLODIPINE BESYLATE 10 MG/1
10 TABLET ORAL DAILY
Qty: 30 TABLET | Refills: 2 | Status: SHIPPED | OUTPATIENT
Start: 2025-03-12

## 2025-03-12 ASSESSMENT — ANXIETY QUESTIONNAIRES
6. BECOMING EASILY ANNOYED OR IRRITABLE: NEARLY EVERY DAY
3. WORRYING TOO MUCH ABOUT DIFFERENT THINGS: MORE THAN HALF THE DAYS
5. BEING SO RESTLESS THAT IT IS HARD TO SIT STILL: NOT AT ALL
7. FEELING AFRAID AS IF SOMETHING AWFUL MIGHT HAPPEN: SEVERAL DAYS
1. FEELING NERVOUS, ANXIOUS, OR ON EDGE: SEVERAL DAYS
IF YOU CHECKED OFF ANY PROBLEMS ON THIS QUESTIONNAIRE, HOW DIFFICULT HAVE THESE PROBLEMS MADE IT FOR YOU TO DO YOUR WORK, TAKE CARE OF THINGS AT HOME, OR GET ALONG WITH OTHER PEOPLE: SOMEWHAT DIFFICULT
2. NOT BEING ABLE TO STOP OR CONTROL WORRYING: MORE THAN HALF THE DAYS
4. TROUBLE RELAXING: SEVERAL DAYS
GAD7 TOTAL SCORE: 10

## 2025-03-12 ASSESSMENT — PATIENT HEALTH QUESTIONNAIRE - PHQ9
SUM OF ALL RESPONSES TO PHQ QUESTIONS 1-9: 4
10. IF YOU CHECKED OFF ANY PROBLEMS, HOW DIFFICULT HAVE THESE PROBLEMS MADE IT FOR YOU TO DO YOUR WORK, TAKE CARE OF THINGS AT HOME, OR GET ALONG WITH OTHER PEOPLE: SOMEWHAT DIFFICULT
SUM OF ALL RESPONSES TO PHQ QUESTIONS 1-9: 4
6. FEELING BAD ABOUT YOURSELF - OR THAT YOU ARE A FAILURE OR HAVE LET YOURSELF OR YOUR FAMILY DOWN: NOT AT ALL
4. FEELING TIRED OR HAVING LITTLE ENERGY: SEVERAL DAYS
8. MOVING OR SPEAKING SO SLOWLY THAT OTHER PEOPLE COULD HAVE NOTICED. OR THE OPPOSITE, BEING SO FIGETY OR RESTLESS THAT YOU HAVE BEEN MOVING AROUND A LOT MORE THAN USUAL: NOT AT ALL
3. TROUBLE FALLING OR STAYING ASLEEP: NOT AT ALL
9. THOUGHTS THAT YOU WOULD BE BETTER OFF DEAD, OR OF HURTING YOURSELF: NOT AT ALL
7. TROUBLE CONCENTRATING ON THINGS, SUCH AS READING THE NEWSPAPER OR WATCHING TELEVISION: SEVERAL DAYS
2. FEELING DOWN, DEPRESSED OR HOPELESS: SEVERAL DAYS
5. POOR APPETITE OR OVEREATING: NOT AT ALL
SUM OF ALL RESPONSES TO PHQ QUESTIONS 1-9: 4
SUM OF ALL RESPONSES TO PHQ QUESTIONS 1-9: 4
1. LITTLE INTEREST OR PLEASURE IN DOING THINGS: SEVERAL DAYS

## 2025-03-12 NOTE — PROGRESS NOTES
[facility]  [facility]  [address] [place] [address]  Dept: [phone]  Dept Fax: [phone]    Office Progress/Follow Up Note  Date ofpatient's visit: 3/12/2025  Patient's Name:  Koby Aleman YOB: 1995            Patient Care Team:  Adrian Flores MD as PCP - General (Internal Medicine)  ================================================================    REASON FOR VISIT/CHIEF COMPLAINT:  FOLLOW-UP VISIT; Follow-up (Patient presents today for follow up on Depression and Anxiety. Patient is doing well.)    HISTORY OF PRESENTING ILLNESS:  History was obtained from: patient, electronic medical record. Koby Rodriguez a 30 y.o. is here for a follow-up visit regarding his hypertension, anxiety depression and obesity.    Hypertension: Blood pressure today is well-controlled 128/82, patient states he is compliant with his medications.  Heart rate was a little elevated 106.  Patient states his heart rate is always normal at home however is elevated when he comes to the office because of nerves.  Patient is asymptomatic and states he feels overall very well.    Depression and anxiety: Patient is currently compliant with his venlafaxine and mirtazapine.  Patient states that he has not followed up with his psychiatrist at this time however will schedule the appointment today.  Patient's PHQ-9 score was 4 and PHYLICIA 10.  Patient states that he feels great today.    Obesity: Patient states he has been exercising and eating a balanced diet.  Patient has lost 1 pound since last appointment.    Smoking: Patient states he is not smoking at this time.      Smoking: None currently   Alcohol: None  Illicit Drug use: None  Occupation: None     Healthcare maintenance:  Health maintenance:  Colon cancer: Starts at 46. If family Hx of CRC then needs to start at 40 or 10 years before the age family got it.  Lung cancer screenin-80 with 20ppy hx who quit <15 years ago. Annual

## 2025-03-12 NOTE — PROGRESS NOTES
Attending Physician Statement  I have discussed the care of Koby Aleman, including pertinent history and exam findings with the resident. I have reviewed the key elements of all parts of the encounter with the resident. Added history includes he is here for follow up HTn, anxiety- tachycardic today- has anxiety at doctor offices. Taking medications but not seen Psych yet. He is working out. I agree with the assessment, and status of the problem list as documented.   Diagnosis Orders   1. Essential hypertension        2. Recurrent major depressive disorder, in full remission        3. Smoking addiction        4. Anxiety        5. Class 2 obesity due to excess calories without serious comorbidity with body mass index (BMI) of 36.0 to 36.9 in adult             The plan and orders should include No orders of the defined types were placed in this encounter.   and this was also documented by the resident.The medication list was reviewed with the resident and is up to date. The return visit should be in 3 months .    Dr Manuelito Shepherd MD, FACP  Associate , Internal Medicine Residency Program  Residency Clinic , [facility] IM  Chair, Department of Internal Medicine  [facility] Internal Medicine Clerkship         3/12/2025, 9:26 AM

## 2025-05-21 ENCOUNTER — OFFICE VISIT (OUTPATIENT)
Age: 30
End: 2025-05-21
Payer: COMMERCIAL

## 2025-05-21 VITALS
SYSTOLIC BLOOD PRESSURE: 144 MMHG | WEIGHT: 269 LBS | HEIGHT: 69 IN | HEART RATE: 87 BPM | OXYGEN SATURATION: 98 % | BODY MASS INDEX: 39.84 KG/M2 | DIASTOLIC BLOOD PRESSURE: 84 MMHG

## 2025-05-21 DIAGNOSIS — E66.812 CLASS 2 OBESITY DUE TO EXCESS CALORIES WITHOUT SERIOUS COMORBIDITY WITH BODY MASS INDEX (BMI) OF 39.0 TO 39.9 IN ADULT: ICD-10-CM

## 2025-05-21 DIAGNOSIS — I10 PRIMARY HYPERTENSION: Primary | ICD-10-CM

## 2025-05-21 DIAGNOSIS — F41.9 ANXIETY: ICD-10-CM

## 2025-05-21 DIAGNOSIS — F33.42 RECURRENT MAJOR DEPRESSIVE DISORDER, IN FULL REMISSION: ICD-10-CM

## 2025-05-21 DIAGNOSIS — E66.09 CLASS 2 OBESITY DUE TO EXCESS CALORIES WITHOUT SERIOUS COMORBIDITY WITH BODY MASS INDEX (BMI) OF 39.0 TO 39.9 IN ADULT: ICD-10-CM

## 2025-05-21 PROCEDURE — 99213 OFFICE O/P EST LOW 20 MIN: CPT

## 2025-05-21 PROCEDURE — G8427 DOCREV CUR MEDS BY ELIG CLIN: HCPCS

## 2025-05-21 PROCEDURE — 3079F DIAST BP 80-89 MM HG: CPT

## 2025-05-21 PROCEDURE — 99212 OFFICE O/P EST SF 10 MIN: CPT

## 2025-05-21 PROCEDURE — G8417 CALC BMI ABV UP PARAM F/U: HCPCS

## 2025-05-21 PROCEDURE — 1036F TOBACCO NON-USER: CPT

## 2025-05-21 PROCEDURE — 3077F SYST BP >= 140 MM HG: CPT

## 2025-05-21 RX ORDER — AMLODIPINE BESYLATE 10 MG/1
10 TABLET ORAL DAILY
Qty: 30 TABLET | Refills: 2 | Status: SHIPPED | OUTPATIENT
Start: 2025-05-21

## 2025-05-21 ASSESSMENT — PATIENT HEALTH QUESTIONNAIRE - PHQ9
SUM OF ALL RESPONSES TO PHQ QUESTIONS 1-9: 0
1. LITTLE INTEREST OR PLEASURE IN DOING THINGS: NOT AT ALL
SUM OF ALL RESPONSES TO PHQ QUESTIONS 1-9: 0
2. FEELING DOWN, DEPRESSED OR HOPELESS: NOT AT ALL

## 2025-05-21 NOTE — PROGRESS NOTES
Attending Physician Statement  I have discussed the care of Koby Aleman, including pertinent history and exam findings with the resident. I have reviewed the key elements of all parts of the encounter with the resident. Follows at [facility] and is stable. He has hx of HTN, on Norvasc 10mg. Did not take meds this morning. I agree with the assessment, and status of the problem list as documented.    Diagnosis Orders   1. Primary hypertension  amLODIPine (NORVASC) 10 MG tablet      2. Class 2 obesity due to excess calories without serious comorbidity with body mass index (BMI) of 39.0 to 39.9 in adult        3. Recurrent major depressive disorder, in full remission        4. Anxiety          The plan and orders should include No orders of the defined types were placed in this encounter.   and this was also documented by the resident.The medication list was reviewed with the resident and is up to date. The return visit should be in 3 months .    Dr Manuelito Shepherd MD, FACP  Associate , Internal Medicine Residency Program  Residency Clinic , [facility] IM  Chair, Department of Internal Medicine  [facility] Internal Medicine Clerkship         5/21/2025, 9:41 AM        
Results   Component Value Date/Time    WBC 9.7 06/19/2024 04:51 AM    HGB 14.6 06/19/2024 04:51 AM     06/19/2024 04:51 AM       BMP:    Lab Results   Component Value Date/Time     06/19/2024 04:51 AM    K 3.6 06/19/2024 04:51 AM     06/19/2024 04:51 AM    CO2 23 06/19/2024 04:51 AM    BUN 10 06/19/2024 04:51 AM    CREATININE 0.9 06/19/2024 04:51 AM    GLUCOSE 94 06/19/2024 04:51 AM       HEMOGLOBIN A1C: No results found for: \"LABA1C\"    FASTING LIPID PANEL:  Lab Results   Component Value Date    CHOL 151 10/28/2022    HDL 33 (L) 10/28/2022    TRIG 65 10/28/2022        Diagnosis Orders   1. Primary hypertension  amLODIPine (NORVASC) 10 MG tablet      2. Class 2 obesity due to excess calories without serious comorbidity with body mass index (BMI) of 39.0 to 39.9 in adult        3. Recurrent major depressive disorder, in full remission        4. Anxiety             ASSESSMENT AND PLAN:    Koby was seen today for hypertension.    Diagnoses and all orders for this visit:    Primary hypertension: Continue to monitor blood pressure at home daily, currently 144/84 in the office without taking his medications.  Normally runs around 120/80 at home per the patient.  -     amLODIPine (NORVASC) 10 MG tablet; Take 1 tablet by mouth daily    Class 2 obesity due to excess calories without serious comorbidity with body mass index (BMI) of 39.0 to 39.9 in adult:  - Diet and exercise  - Patient has lost approximately 15 pounds since last appointment.    Recurrent major depressive disorder, in full remission:  - Continue venlafaxine and mirtazapine    Anxiety  - Continue venlafaxine and mirtazapine        FOLLOW UP AND INSTRUCTIONS:  No follow-ups on file.    Koby received counseling on the following healthy behaviors: nutrition, exercise, and medication adherence    Discussed use, benefit, and side effects of prescribed medications.  Barriers to medication compliance addressed.  All patient questions

## 2025-08-26 ENCOUNTER — OFFICE VISIT (OUTPATIENT)
Age: 30
End: 2025-08-26
Payer: COMMERCIAL

## 2025-08-26 VITALS
RESPIRATION RATE: 18 BRPM | WEIGHT: 273 LBS | HEART RATE: 92 BPM | DIASTOLIC BLOOD PRESSURE: 86 MMHG | TEMPERATURE: 97.2 F | SYSTOLIC BLOOD PRESSURE: 129 MMHG | BODY MASS INDEX: 40.43 KG/M2 | OXYGEN SATURATION: 97 % | HEIGHT: 69 IN

## 2025-08-26 DIAGNOSIS — I10 PRIMARY HYPERTENSION: ICD-10-CM

## 2025-08-26 DIAGNOSIS — F33.42 RECURRENT MAJOR DEPRESSIVE DISORDER, IN FULL REMISSION: ICD-10-CM

## 2025-08-26 DIAGNOSIS — F41.9 ANXIETY: ICD-10-CM

## 2025-08-26 DIAGNOSIS — R22.1 LUMP IN NECK: Primary | ICD-10-CM

## 2025-08-26 DIAGNOSIS — E66.09 CLASS 2 OBESITY DUE TO EXCESS CALORIES WITHOUT SERIOUS COMORBIDITY WITH BODY MASS INDEX (BMI) OF 39.0 TO 39.9 IN ADULT: ICD-10-CM

## 2025-08-26 DIAGNOSIS — E66.812 CLASS 2 OBESITY DUE TO EXCESS CALORIES WITHOUT SERIOUS COMORBIDITY WITH BODY MASS INDEX (BMI) OF 39.0 TO 39.9 IN ADULT: ICD-10-CM

## 2025-08-26 PROCEDURE — 99212 OFFICE O/P EST SF 10 MIN: CPT

## 2025-08-26 PROCEDURE — 3074F SYST BP LT 130 MM HG: CPT

## 2025-08-26 PROCEDURE — 99213 OFFICE O/P EST LOW 20 MIN: CPT

## 2025-08-26 PROCEDURE — 1036F TOBACCO NON-USER: CPT

## 2025-08-26 PROCEDURE — G8427 DOCREV CUR MEDS BY ELIG CLIN: HCPCS

## 2025-08-26 PROCEDURE — 3079F DIAST BP 80-89 MM HG: CPT

## 2025-08-26 PROCEDURE — G8417 CALC BMI ABV UP PARAM F/U: HCPCS

## 2025-08-26 SDOH — ECONOMIC STABILITY: FOOD INSECURITY: WITHIN THE PAST 12 MONTHS, YOU WORRIED THAT YOUR FOOD WOULD RUN OUT BEFORE YOU GOT MONEY TO BUY MORE.: NEVER TRUE

## 2025-08-26 SDOH — ECONOMIC STABILITY: FOOD INSECURITY: WITHIN THE PAST 12 MONTHS, THE FOOD YOU BOUGHT JUST DIDN'T LAST AND YOU DIDN'T HAVE MONEY TO GET MORE.: NEVER TRUE

## 2025-08-26 ASSESSMENT — PATIENT HEALTH QUESTIONNAIRE - PHQ9
4. FEELING TIRED OR HAVING LITTLE ENERGY: NOT AT ALL
8. MOVING OR SPEAKING SO SLOWLY THAT OTHER PEOPLE COULD HAVE NOTICED. OR THE OPPOSITE, BEING SO FIGETY OR RESTLESS THAT YOU HAVE BEEN MOVING AROUND A LOT MORE THAN USUAL: NOT AT ALL
DEPRESSION UNABLE TO ASSESS: PT REFUSES
2. FEELING DOWN, DEPRESSED OR HOPELESS: NOT AT ALL
10. IF YOU CHECKED OFF ANY PROBLEMS, HOW DIFFICULT HAVE THESE PROBLEMS MADE IT FOR YOU TO DO YOUR WORK, TAKE CARE OF THINGS AT HOME, OR GET ALONG WITH OTHER PEOPLE: NOT DIFFICULT AT ALL
SUM OF ALL RESPONSES TO PHQ QUESTIONS 1-9: 0
5. POOR APPETITE OR OVEREATING: NOT AT ALL
9. THOUGHTS THAT YOU WOULD BE BETTER OFF DEAD, OR OF HURTING YOURSELF: NOT AT ALL
3. TROUBLE FALLING OR STAYING ASLEEP: NOT AT ALL
SUM OF ALL RESPONSES TO PHQ QUESTIONS 1-9: 0
6. FEELING BAD ABOUT YOURSELF - OR THAT YOU ARE A FAILURE OR HAVE LET YOURSELF OR YOUR FAMILY DOWN: NOT AT ALL
7. TROUBLE CONCENTRATING ON THINGS, SUCH AS READING THE NEWSPAPER OR WATCHING TELEVISION: NOT AT ALL
SUM OF ALL RESPONSES TO PHQ QUESTIONS 1-9: 0
1. LITTLE INTEREST OR PLEASURE IN DOING THINGS: NOT AT ALL
SUM OF ALL RESPONSES TO PHQ QUESTIONS 1-9: 0

## 2025-09-05 ENCOUNTER — HOSPITAL ENCOUNTER (OUTPATIENT)
Dept: CT IMAGING | Age: 30
Discharge: HOME | End: 2025-09-05
Attending: HOSPITALIST
Payer: COMMERCIAL

## 2025-09-05 DIAGNOSIS — R22.1 LUMP IN NECK: ICD-10-CM

## 2025-09-05 LAB
EGFR, POC: >90 ML/MIN/1.73M2
POC CREATININE: 0.8 MG/DL (ref 0.51–1.19)

## 2025-09-05 PROCEDURE — 6360000004 HC RX CONTRAST MEDICATION: Performed by: HOSPITALIST

## 2025-09-05 PROCEDURE — 82565 ASSAY OF CREATININE: CPT

## 2025-09-05 PROCEDURE — 70491 CT SOFT TISSUE NECK W/DYE: CPT

## 2025-09-05 RX ORDER — IOPAMIDOL 755 MG/ML
75 INJECTION, SOLUTION INTRAVASCULAR
Status: COMPLETED | OUTPATIENT
Start: 2025-09-05 | End: 2025-09-05

## 2025-09-05 RX ADMIN — IOPAMIDOL 75 ML: 755 INJECTION, SOLUTION INTRAVENOUS at 09:26
